# Patient Record
Sex: MALE | Race: WHITE | NOT HISPANIC OR LATINO | Employment: OTHER | URBAN - METROPOLITAN AREA
[De-identification: names, ages, dates, MRNs, and addresses within clinical notes are randomized per-mention and may not be internally consistent; named-entity substitution may affect disease eponyms.]

---

## 2019-10-04 ENCOUNTER — TELEPHONE (OUTPATIENT)
Dept: OTOLARYNGOLOGY | Facility: CLINIC | Age: 69
End: 2019-10-04

## 2019-11-15 ENCOUNTER — CLINICAL SUPPORT (OUTPATIENT)
Dept: AUDIOLOGY | Facility: CLINIC | Age: 69
End: 2019-11-15
Payer: MEDICARE

## 2019-11-15 ENCOUNTER — OFFICE VISIT (OUTPATIENT)
Dept: OTOLARYNGOLOGY | Facility: CLINIC | Age: 69
End: 2019-11-15
Payer: MEDICARE

## 2019-11-15 VITALS
SYSTOLIC BLOOD PRESSURE: 133 MMHG | DIASTOLIC BLOOD PRESSURE: 77 MMHG | HEART RATE: 58 BPM | BODY MASS INDEX: 30.39 KG/M2 | WEIGHT: 194 LBS

## 2019-11-15 DIAGNOSIS — H83.3X3 NOISE-INDUCED HEARING LOSS OF BOTH EARS: ICD-10-CM

## 2019-11-15 DIAGNOSIS — H91.13 PRESBYCUSIS OF BOTH EARS: ICD-10-CM

## 2019-11-15 DIAGNOSIS — H93.13 TINNITUS OF BOTH EARS: ICD-10-CM

## 2019-11-15 DIAGNOSIS — H93.13 TINNITUS AURIUM, BILATERAL: ICD-10-CM

## 2019-11-15 DIAGNOSIS — H90.3 SENSORINEURAL HEARING LOSS (SNHL) OF BOTH EARS: Primary | ICD-10-CM

## 2019-11-15 DIAGNOSIS — H90.3 SENSORINEURAL HEARING LOSS OF BOTH EARS: Primary | ICD-10-CM

## 2019-11-15 PROCEDURE — 92557 PR COMPREHENSIVE HEARING TEST: ICD-10-PCS | Mod: S$GLB,,, | Performed by: AUDIOLOGIST

## 2019-11-15 PROCEDURE — 99203 OFFICE O/P NEW LOW 30 MIN: CPT | Mod: S$GLB,,, | Performed by: OTOLARYNGOLOGY

## 2019-11-15 PROCEDURE — 99999 PR PBB SHADOW E&M-EST. PATIENT-LVL III: ICD-10-PCS | Mod: PBBFAC,,, | Performed by: OTOLARYNGOLOGY

## 2019-11-15 PROCEDURE — 99203 PR OFFICE/OUTPT VISIT, NEW, LEVL III, 30-44 MIN: ICD-10-PCS | Mod: S$GLB,,, | Performed by: OTOLARYNGOLOGY

## 2019-11-15 PROCEDURE — 92567 TYMPANOMETRY: CPT | Mod: S$GLB,,, | Performed by: AUDIOLOGIST

## 2019-11-15 PROCEDURE — 99999 PR PBB SHADOW E&M-EST. PATIENT-LVL III: CPT | Mod: PBBFAC,,, | Performed by: OTOLARYNGOLOGY

## 2019-11-15 PROCEDURE — 92557 COMPREHENSIVE HEARING TEST: CPT | Mod: S$GLB,,, | Performed by: AUDIOLOGIST

## 2019-11-15 PROCEDURE — 92567 PR TYMPA2METRY: ICD-10-PCS | Mod: S$GLB,,, | Performed by: AUDIOLOGIST

## 2019-11-15 NOTE — PROGRESS NOTES
Tanner Ulloa was seen in the clinic today for an audiological evaluation.  Mr. Ulloa reported bilateral hearing loss and tinnitus.  Mr. Ulloa currently utilizes binaural [employer] hearing aids.    Audiological testing revealed a moderate to severe sensorineural hearing loss for the right ear and a mild to severe sensorineural hearing loss for the left ear.  A speech reception threshold was obtained at 50 dBHL for the right ear and at 60 dBHL for the left ear.  Speech discrimination was 56% for the right ear and 60% for the left ear.      Tympanometry testing revealed a Type A tympanogram for the right ear and a Type A tympanogram for the left ear.      Recommendations:  1. Otologic evaluation  2. Annual audiological evaluation  3. Hearing protection when in noise   4. Continue use of binaural amplification  5. PRN follow-up with dispensing audiologist for hearing aid service

## 2019-11-18 NOTE — PROGRESS NOTES
Subjective:       Patient ID: Tanner Ulloa is a 69 y.o. male.    Chief Complaint: Hearing Loss    HPI     Tanner Ulloa is a 69 y.o. male  Presents for evaluation of severe bilateral hearing loss.  Hearing loss has been present for decades.  He has a history of noise exposure due to his work and he is also a frequent [place].  He has expensive hearing aids, but he says they do not give him any benefit so he rather not wear them.  He has associated tinnitus that is constant.  He denies any history of ear infections.  He wants to know if there is any surgery that could restore his hearing.  He says he met someone who had a laser ear surgery that resolved her hearing loss.    Review of Systems   Constitutional: Negative for chills, fever and unexpected weight change.   HENT: Negative for sore throat and trouble swallowing.    Eyes: Negative for pain and visual disturbance.   Respiratory: Negative for apnea and shortness of breath.    Cardiovascular: Negative for chest pain and palpitations.   Gastrointestinal: Negative for abdominal pain and nausea.   Endocrine: Negative for cold intolerance and heat intolerance.   Musculoskeletal: Negative for joint swelling and neck stiffness.   Skin: Negative for color change and rash.   Neurological: Negative for facial asymmetry and headaches.   Hematological: Negative for adenopathy. Does not bruise/bleed easily.   Psychiatric/Behavioral: Negative for agitation. The patient is not nervous/anxious.        Objective:      Physical Exam   Constitutional: He is oriented to person, place, and time. He appears well-developed and well-nourished. No distress.   HENT:   Head: Normocephalic and atraumatic.   Right Ear: Tympanic membrane, external ear and ear canal normal.   Left Ear: Tympanic membrane, external ear and ear canal normal.   Nose: Nose normal.   Mouth/Throat: Uvula is midline, oropharynx is clear and moist and mucous membranes are normal.   Cadet: Midline  Rinne:  AC > BC @ 512Hz   Eyes: Pupils are equal, round, and reactive to light. Conjunctivae and EOM are normal.   Neck: Normal range of motion. No tracheal deviation present. No thyromegaly present.   Cardiovascular: Normal rate and regular rhythm.   Pulmonary/Chest: Effort normal. No respiratory distress.   Musculoskeletal: Normal range of motion.   Lymphadenopathy:        Head (right side): No submental, no submandibular and no tonsillar adenopathy present.        Head (left side): No submental, no submandibular and no tonsillar adenopathy present.     He has no cervical adenopathy.   Neurological: He is alert and oriented to person, place, and time.   Psychiatric: He has a normal mood and affect. His behavior is normal.   Nursing note and vitals reviewed.      Data:      Audiogram tracings independently reviewed and discussed with patient shows moderate-severe SNHL with poor WRS    Assessment:       1. Sensorineural hearing loss (SNHL) of both ears    2. Tinnitus aurium, bilateral    3. Noise-induced hearing loss of both ears    4. Presbycusis of both ears        Plan:     In summary this is a pleasant 69 y.o. with severe hearing impairment, who has difficulty with conversation and does not benefit from amplification     patient is a borderline CI candidate at this time.   Encourage hearing aid use   may consider CI screening evaluation if interested   patient and wife to call if they decide they want to pursue candidate evaluation further

## 2019-12-09 ENCOUNTER — PATIENT MESSAGE (OUTPATIENT)
Dept: GASTROENTEROLOGY | Facility: CLINIC | Age: 69
End: 2019-12-09

## 2019-12-09 ENCOUNTER — OFFICE VISIT (OUTPATIENT)
Dept: GASTROENTEROLOGY | Facility: CLINIC | Age: 69
End: 2019-12-09
Payer: MEDICARE

## 2019-12-09 ENCOUNTER — TELEPHONE (OUTPATIENT)
Dept: GASTROENTEROLOGY | Facility: CLINIC | Age: 69
End: 2019-12-09

## 2019-12-09 ENCOUNTER — LAB VISIT (OUTPATIENT)
Dept: LAB | Facility: HOSPITAL | Age: 69
End: 2019-12-09
Attending: INTERNAL MEDICINE
Payer: MEDICARE

## 2019-12-09 VITALS
BODY MASS INDEX: 26.82 KG/M2 | HEIGHT: 67 IN | HEART RATE: 84 BPM | SYSTOLIC BLOOD PRESSURE: 122 MMHG | WEIGHT: 170.88 LBS | DIASTOLIC BLOOD PRESSURE: 80 MMHG

## 2019-12-09 DIAGNOSIS — K58.0 IRRITABLE BOWEL SYNDROME WITH DIARRHEA: ICD-10-CM

## 2019-12-09 DIAGNOSIS — R19.8 IRREGULAR BOWEL HABITS: ICD-10-CM

## 2019-12-09 DIAGNOSIS — B18.2 CHRONIC HEPATITIS C WITHOUT HEPATIC COMA: Chronic | ICD-10-CM

## 2019-12-09 DIAGNOSIS — B18.2 CHRONIC HEPATITIS C WITHOUT HEPATIC COMA: Primary | Chronic | ICD-10-CM

## 2019-12-09 PROBLEM — B19.20 HEPATITIS C: Chronic | Status: ACTIVE | Noted: 2019-12-09

## 2019-12-09 LAB
BASOPHILS # BLD AUTO: 0.07 K/UL (ref 0–0.2)
BASOPHILS NFR BLD: 0.7 % (ref 0–1.9)
DIFFERENTIAL METHOD: ABNORMAL
EOSINOPHIL # BLD AUTO: 0.3 K/UL (ref 0–0.5)
EOSINOPHIL NFR BLD: 3.5 % (ref 0–8)
ERYTHROCYTE [DISTWIDTH] IN BLOOD BY AUTOMATED COUNT: 14.7 % (ref 11.5–14.5)
HCT VFR BLD AUTO: 55.2 % (ref 40–54)
HGB BLD-MCNC: 16.8 G/DL (ref 14–18)
IMM GRANULOCYTES # BLD AUTO: 0.04 K/UL (ref 0–0.04)
IMM GRANULOCYTES NFR BLD AUTO: 0.4 % (ref 0–0.5)
INR PPP: 1 (ref 0.8–1.2)
LYMPHOCYTES # BLD AUTO: 1.4 K/UL (ref 1–4.8)
LYMPHOCYTES NFR BLD: 14.2 % (ref 18–48)
MCH RBC QN AUTO: 28.7 PG (ref 27–31)
MCHC RBC AUTO-ENTMCNC: 30.4 G/DL (ref 32–36)
MCV RBC AUTO: 94 FL (ref 82–98)
MONOCYTES # BLD AUTO: 0.9 K/UL (ref 0.3–1)
MONOCYTES NFR BLD: 9.5 % (ref 4–15)
NEUTROPHILS # BLD AUTO: 7.1 K/UL (ref 1.8–7.7)
NEUTROPHILS NFR BLD: 71.7 % (ref 38–73)
NRBC BLD-RTO: 0 /100 WBC
PLATELET # BLD AUTO: 249 K/UL (ref 150–350)
PMV BLD AUTO: 9.6 FL (ref 9.2–12.9)
PROTHROMBIN TIME: 10.4 SEC (ref 9–12.5)
RBC # BLD AUTO: 5.86 M/UL (ref 4.6–6.2)
WBC # BLD AUTO: 9.83 K/UL (ref 3.9–12.7)

## 2019-12-09 PROCEDURE — 36415 COLL VENOUS BLD VENIPUNCTURE: CPT

## 2019-12-09 PROCEDURE — 86706 HEP B SURFACE ANTIBODY: CPT

## 2019-12-09 PROCEDURE — 99204 PR OFFICE/OUTPT VISIT, NEW, LEVL IV, 45-59 MIN: ICD-10-PCS | Mod: S$GLB,,, | Performed by: INTERNAL MEDICINE

## 2019-12-09 PROCEDURE — 83540 ASSAY OF IRON: CPT

## 2019-12-09 PROCEDURE — 1159F MED LIST DOCD IN RCRD: CPT | Mod: S$GLB,,, | Performed by: INTERNAL MEDICINE

## 2019-12-09 PROCEDURE — 85025 COMPLETE CBC W/AUTO DIFF WBC: CPT

## 2019-12-09 PROCEDURE — 1126F AMNT PAIN NOTED NONE PRSNT: CPT | Mod: S$GLB,,, | Performed by: INTERNAL MEDICINE

## 2019-12-09 PROCEDURE — 85610 PROTHROMBIN TIME: CPT

## 2019-12-09 PROCEDURE — 82105 ALPHA-FETOPROTEIN SERUM: CPT

## 2019-12-09 PROCEDURE — 86704 HEP B CORE ANTIBODY TOTAL: CPT

## 2019-12-09 PROCEDURE — 87522 HEPATITIS C REVRS TRNSCRPJ: CPT

## 2019-12-09 PROCEDURE — 80053 COMPREHEN METABOLIC PANEL: CPT

## 2019-12-09 PROCEDURE — 1126F PR PAIN SEVERITY QUANTIFIED, NO PAIN PRESENT: ICD-10-PCS | Mod: S$GLB,,, | Performed by: INTERNAL MEDICINE

## 2019-12-09 PROCEDURE — 1101F PT FALLS ASSESS-DOCD LE1/YR: CPT | Mod: CPTII,S$GLB,, | Performed by: INTERNAL MEDICINE

## 2019-12-09 PROCEDURE — 82728 ASSAY OF FERRITIN: CPT

## 2019-12-09 PROCEDURE — 1159F PR MEDICATION LIST DOCUMENTED IN MEDICAL RECORD: ICD-10-PCS | Mod: S$GLB,,, | Performed by: INTERNAL MEDICINE

## 2019-12-09 PROCEDURE — 1101F PR PT FALLS ASSESS DOC 0-1 FALLS W/OUT INJ PAST YR: ICD-10-PCS | Mod: CPTII,S$GLB,, | Performed by: INTERNAL MEDICINE

## 2019-12-09 PROCEDURE — 82977 ASSAY OF GGT: CPT

## 2019-12-09 PROCEDURE — 99204 OFFICE O/P NEW MOD 45 MIN: CPT | Mod: S$GLB,,, | Performed by: INTERNAL MEDICINE

## 2019-12-09 PROCEDURE — 99999 PR PBB SHADOW E&M-EST. PATIENT-LVL III: ICD-10-PCS | Mod: PBBFAC,,, | Performed by: INTERNAL MEDICINE

## 2019-12-09 PROCEDURE — 99999 PR PBB SHADOW E&M-EST. PATIENT-LVL III: CPT | Mod: PBBFAC,,, | Performed by: INTERNAL MEDICINE

## 2019-12-09 PROCEDURE — 87340 HEPATITIS B SURFACE AG IA: CPT

## 2019-12-09 RX ORDER — FLUOXETINE HYDROCHLORIDE 40 MG/1
40 CAPSULE ORAL DAILY
COMMUNITY

## 2019-12-09 RX ORDER — DOXYCYCLINE 100 MG/1
100 CAPSULE ORAL EVERY 12 HOURS
COMMUNITY
End: 2020-03-30

## 2019-12-09 NOTE — TELEPHONE ENCOUNTER
Offered patient wife sooner appointment but due to wife schedule patient has an appointment for 01/08/2020 with JONAS Quiles

## 2019-12-09 NOTE — PROGRESS NOTES
[facility]  Gastroenterology Note    Patient referred at the request of:  Freddie Mondragon MD  [address] [facility]   [address]  [place] [address]    Subjective:       Patient ID: Tanner Ulloa is a 69 y.o. male.    Chief Complaint: Diarrhea and Hepatitis C    Mr. Ulloa had a positive C. Diff antigen and a negative toxin in 2015. He also had a negative colonoscopy in 2015 and normal EGD as well. Hepatitis C: Patient complains has a positive Hepatitis C antibody test and PCR with genotype 1b. Patient tested positive several years ago. Test was performed as part of an evaluation of abnormal liver function test:(elevated ALT, elevated AST). Hepatitis C risk factors present are tattoos. Patient denies accidental needle stick, history of blood transfusion, history of clotting factor transfusion, intranasal drug use, multiple sexual partners, renal dialysis, sexual contact with person with liver disease. Patient has had other studies performed. Results: ultrasound of abdomen, result: normal, hepatitis C RNA by PCR, result: positive. Patient has not had prior treatment for Hepatitis C. Patient does not have a past history of liver disease. Patient does not have a family history of liver disease.  Patient's current symptoms include diarrhea, nausea and weight loss. Patient denies anorexia, dark urine, headache and jaundice. Symptoms have been present for approximately several years. The symptoms are stable.        Diarrhea    This is a chronic problem. The current episode started more than 1 year ago. The problem occurs 5 to 10 times per day. The problem has been waxing and waning. The stool consistency is described as mucous and watery. The patient states that diarrhea does not awaken him from sleep. Associated symptoms include bloating, increased flatus and weight loss. Pertinent negatives include no abdominal pain, arthralgias, chills, coughing, fever, headaches, myalgias, sweats, URI or vomiting. The  symptoms are aggravated by dairy products. There are no known risk factors. He has tried nothing for the symptoms. His past medical history is significant for irritable bowel syndrome. There is no history of bowel resection, inflammatory bowel disease, malabsorption, a recent abdominal surgery or short gut syndrome.     Past Medical History:   Diagnosis Date    Anticoagulant long-term use     Cholelithiasis with choledocholithiasis 2013    tx's with cholecystectomy and ERCP    Chronic back pain     GERD (gastroesophageal reflux disease)     Heart attack     Hepatitis C     History of Clostridium difficile infection     Hypertension     Irritable bowel syndrome     Testosterone deficiency      Past Surgical History:   Procedure Laterality Date    BACK SURGERY  04/2004    CHOLECYSTECTOMY  04/2013    COLONOSCOPY  2015    CORONARY STENT PLACEMENT      ERCP  2013    stent plaecement; then stone removal and stent replacement; then stent removal    KNEE ARTHRODESIS Right     UPPER GASTROINTESTINAL ENDOSCOPY       Current Outpatient Medications on File Prior to Visit   Medication Sig Dispense Refill    amlodipine (NORVASC) 10 MG tablet Take 10 mg by mouth every evening. 1/2 tab hs      aspirin 81 MG Chew Take 81 mg by mouth once daily.      clopidogrel (PLAVIX) 75 mg tablet Take 75 mg by mouth once daily.      doxycycline (VIBRAMYCIN) 100 MG Cap Take 100 mg by mouth every 12 (twelve) hours.      FLUoxetine 40 MG capsule Take 40 mg by mouth once daily.      metoprolol tartrate (LOPRESSOR) 50 MG tablet Take 50 mg by mouth 2 (two) times daily. 1/2 tab      omeprazole-sodium bicarbonate (ZEGERID) 40-1.1 mg-gram per capsule Take 1 capsule by mouth before breakfast.      oxycodone (ROXICODONE) 30 MG Tab Take 5 mg by mouth every 6 (six) hours as needed.      citalopram (CELEXA) 20 MG tablet Take 20 mg by mouth once daily.      clonazepam (KLONOPIN) 2 MG Tab Take 2 mg by mouth every evening. Leg cramping       colestipol (COLESTID) 1 gram Tab Take 1 tablet (1 g total) by mouth 2 (two) times daily. 60 tablet 6    cyanocobalamin (VITAMIN B-12) 100 MCG tablet Take 100 mcg by mouth once daily.      furosemide (LASIX) 20 MG tablet Take 1 tablet (20 mg total) by mouth 2 (two) times daily. 30 tablet 0    isosorbide mononitrate (IMDUR) 60 MG 24 hr tablet Take 60 mg by mouth once daily.      losartan (COZAAR) 50 MG tablet       oxybutynin (DITROPAN-XL) 5 MG TR24 Take 5 mg by mouth once daily.      peg-electrolyte soln (PEG-3350 WITH FLAVOR PACKS) 420 gram SolR Use as directed 4000 mL 0     No current facility-administered medications on file prior to visit.      Review of patient's allergies indicates:   Allergen Reactions    Dilaudid [hydromorphone (bulk)] Other (See Comments)     Severe headache    Statins-hmg-coa reductase inhibitors      Cramps is the reaction    Stadol [butorphanol tartrate] Palpitations    Sulfa (sulfonamide antibiotics) Rash    Talwin [pentazocine lactate] Palpitations     Social History     Socioeconomic History    Marital status:      Spouse name: Not on file    Number of children: Not on file    Years of education: Not on file    Highest education level: Not on file   Occupational History    Not on file   Social Needs    Financial resource strain: Not on file    Food insecurity:     Worry: Not on file     Inability: Not on file    Transportation needs:     Medical: Not on file     Non-medical: Not on file   Tobacco Use    Smoking status: Former Smoker    Smokeless tobacco: Never Used   Substance and Sexual Activity    Alcohol use: No    Drug use: No    Sexual activity: Yes     Partners: Female   Lifestyle    Physical activity:     Days per week: Not on file     Minutes per session: Not on file    Stress: Not on file   Relationships    Social connections:     Talks on phone: Not on file     Gets together: Not on file     Attends Jainism service: Not on file      "Active member of club or organization: Not on file     Attends meetings of clubs or organizations: Not on file     Relationship status: Not on file   Other Topics Concern    Not on file   Social History Narrative         Family History   Problem Relation Age of Onset    Diverticulitis Father         required bowel resection    Colon cancer Neg Hx          Review of Systems   Constitutional: Positive for weight loss. Negative for activity change, appetite change, chills, fatigue, fever and unexpected weight change.   HENT: Negative for congestion, ear pain, hearing loss, mouth sores, trouble swallowing and voice change.    Eyes: Negative for pain, redness and itching.   Respiratory: Negative for apnea, cough, choking, chest tightness, shortness of breath and wheezing.    Cardiovascular: Negative for chest pain, palpitations and leg swelling.   Gastrointestinal: Positive for bloating, diarrhea, flatus and nausea. Negative for abdominal distention, abdominal pain, anal bleeding, blood in stool, constipation and vomiting.   Endocrine: Negative for cold intolerance, heat intolerance and polyphagia.   Genitourinary: Negative for dysuria, hematuria and urgency.   Musculoskeletal: Negative for arthralgias, joint swelling, myalgias and neck pain.   Skin: Negative for pallor and rash.   Allergic/Immunologic: Negative for environmental allergies and food allergies.   Neurological: Positive for weakness. Negative for dizziness, facial asymmetry, light-headedness and headaches.   Hematological: Negative for adenopathy. Does not bruise/bleed easily.   Psychiatric/Behavioral: Negative for agitation, behavioral problems, confusion and decreased concentration. The patient is nervous/anxious.        Objective:       Vitals:    12/09/19 1031   BP: 122/80   Pulse: 84   Weight: 77.5 kg (170 lb 13.7 oz)   Height: 5' 7" (1.702 m)       Physical Exam   Constitutional: He is oriented to person, place, and time. He appears " well-developed and well-nourished.   HENT:   Head: Normocephalic and atraumatic.   Eyes: Pupils are equal, round, and reactive to light. Conjunctivae are normal.   Neck: Normal range of motion. Neck supple. No tracheal deviation present. No thyromegaly present.   Cardiovascular: Normal rate and regular rhythm.   Pulmonary/Chest: Effort normal and breath sounds normal. He has no wheezes. He has no rales. He exhibits no tenderness.   Abdominal: Soft. Bowel sounds are normal. He exhibits no distension and no mass. There is no tenderness. There is no guarding.   Musculoskeletal: Normal range of motion.   Lymphadenopathy:     He has no cervical adenopathy.   Neurological: He is alert and oriented to person, place, and time. No cranial nerve deficit.   Skin: Skin is warm and dry.   Psychiatric: He has a normal mood and affect. His behavior is normal.   Nursing note and vitals reviewed.      Assessment:       1. Chronic hepatitis C without hepatic coma    2. Irregular bowel habits    3. Irritable bowel syndrome with diarrhea        Plan:       Chronic hepatitis C without hepatic coma  Comments:  genotype 1b  Orders:  -     US Abdomen Limited (LIVER); Future; Expected date: 12/09/2019  -     AFP tumor marker; Future; Expected date: 12/09/2019  -     CBC W/ AUTO DIFFERENTIAL; Future; Expected date: 12/09/2019  -     Ferritin; Future; Expected date: 12/09/2019  -     Gamma GT; Future; Expected date: 12/09/2019  -     Hepatitis B core antibody, total; Future; Expected date: 12/09/2019  -     Hepatitis B surface antibody; Future; Expected date: 12/09/2019  -     Hepatitis B surface antigen; Future; Expected date: 12/09/2019  -     Hepatitis C RNA, quantitative, PCR; Future; Expected date: 12/09/2019  -     Iron and TIBC; Future; Expected date: 12/09/2019  -     Protime-INR; Future; Expected date: 12/09/2019  -     Hepatic function panel; Future; Expected date: 12/09/2019  -     Comprehensive metabolic panel; Future; Expected  date: 12/09/2019    Irregular bowel habits  -     Stool Exam-Ova,Cysts,Parasites; Future; Expected date: 12/09/2019  -     CULTURE, STOOL; Future; Expected date: 12/09/2019  -     WBC, Stool; Future; Expected date: 12/09/2019  -     H. pylori antigen, stool; Future; Expected date: 12/09/2019  -     Clostridium difficile EIA; Future; Expected date: 12/09/2019  -     Lactoferrin, fecal, quantitative; Future; Expected date: 12/09/2019  -     Calprotectin; Future; Expected date: 12/09/2019    Irritable bowel syndrome with diarrhea      Thanks for letting us participate in the care of your patient.    Ricco Davis M.D.

## 2019-12-09 NOTE — LETTER
December 9, 2019      Freddie Mondragon MD  [address] [address]           [facility]  [address] [place] [address]  Phone: [phone]  Fax: [phone]          Patient: Tanner Ulloa   MR Number: [id number]   YOB: 1950   Date of Visit: 12/9/2019       Dear Dr. Freddie Mondragon:    Thank you for referring Tanner Ulloa to me for evaluation. Attached you will find relevant portions of my assessment and plan of care.    If you have questions, please do not hesitate to call me. I look forward to following Tanner Ulloa along with you.    Sincerely,    Ricco Davis MD    Enclosure  CC:  No Recipients    If you would like to receive this communication electronically, please contact [email or web address] or [phone] to request more information on [employer] access.    For providers and/or their staff who would like to refer a patient to [facility], please contact us through our one-stop-shop provider referral line, [facility], at [phone].    If you feel you have received this communication in error or would no longer like to receive these types of communications, please e-mail [email or web address]

## 2019-12-09 NOTE — PATIENT INSTRUCTIONS
Chronic hepatitis C without hepatic coma  Comments:  genotype 1b  Orders:  -     US Abdomen Limited (LIVER); Future; Expected date: 12/09/2019  -     AFP tumor marker; Future; Expected date: 12/09/2019  -     CBC W/ AUTO DIFFERENTIAL; Future; Expected date: 12/09/2019  -     Ferritin; Future; Expected date: 12/09/2019  -     Gamma GT; Future; Expected date: 12/09/2019  -     Hepatitis B core antibody, total; Future; Expected date: 12/09/2019  -     Hepatitis B surface antibody; Future; Expected date: 12/09/2019  -     Hepatitis B surface antigen; Future; Expected date: 12/09/2019  -     Hepatitis C RNA, quantitative, PCR; Future; Expected date: 12/09/2019  -     Iron and TIBC; Future; Expected date: 12/09/2019  -     Protime-INR; Future; Expected date: 12/09/2019  -     Hepatic function panel; Future; Expected date: 12/09/2019  -     Comprehensive metabolic panel; Future; Expected date: 12/09/2019    Irregular bowel habits  -     Stool Exam-Ova,Cysts,Parasites; Future; Expected date: 12/09/2019  -     CULTURE, STOOL; Future; Expected date: 12/09/2019  -     WBC, Stool; Future; Expected date: 12/09/2019  -     H. pylori antigen, stool; Future; Expected date: 12/09/2019  -     Clostridium difficile EIA; Future; Expected date: 12/09/2019  -     Lactoferrin, fecal, quantitative; Future; Expected date: 12/09/2019  -     Calprotectin; Future; Expected date: 12/09/2019    Irritable bowel syndrome with diarrhea      Thanks for trusting us with your healthcare needs and using [facility]. If you want to ask us a question, you can do so by replying to this message or by calling [phone].    Sincerely,    Ricco Davis M.D.      To rate your experience with Dr. Davis please click on the link below:    [email or web address]          Uncertain Causes of Diarrhea (Adult)    Diarrhea is when stools are loose and watery. This can be caused by:  · Viral infections  · Bacterial  infections  · Food poisoning  · Parasites  · Irritable bowel syndrome (IBS)  · Inflammatory bowel diseases such as ulcerative colitis, Crohn's disease, and celiac disease  · Food intolerance, such as to lactose, the sugar found in milk and milk products  · Reaction to medicines like antibiotics, laxatives, cancer drugs, and antacids  Along with diarrhea, you may also have:  · Abdominal pain and cramping  · Nausea and vomiting  · Loss of bowel control  · Fever and chills  · Bloody stools  In some cases, antibiotics may help to treat diarrhea. You may have a stool sample test. This is done to see what is causing your diarrhea, and if antibiotics will help treat it. The results of a stool sample test may take up to 2 days. The healthcare provider may not give you antibiotics until he or she has the stool test results.  Diarrhea can cause dehydration. This is the loss of too much water and other fluids from the body. When this occurs, body fluid must be replaced. This can be done with oral rehydration solutions. Oral rehydration solutions are available at drugstores and grocery stores without a prescription.  Home care  Follow all instructions given by your healthcare provider. Rest at home for the next 24 hours, or until you feel better. Avoid caffeine, tobacco, and alcohol. These can make diarrhea, cramping, and pain worse.  If taking medicines:  · Dont take over-the-counter diarrhea or nausea medicines unless your healthcare provider tells you to.  · You may use acetaminophen or NSAID medicines like ibuprofen or naproxen to reduce pain and fever. Dont use these if you have chronic liver or kidney disease, or ever had a stomach ulcer or gastrointestinal bleeding. Don't use NSAID medicines if you are already taking one for another condition (like arthritis) or are on daily aspirin therapy (such as for heart disease or after a stroke). Talk with your healthcare provider first.  · If antibiotics were prescribed, be  sure you take them until they are finished. Dont stop taking them even when you feel better. Antibiotics must be taken as a full course.  To prevent the spread of illness:  · Remember that washing with soap and water and using alcohol-based  is the best way to prevent the spread of infection.  · Clean the toilet after each use.  · Wash your hands before eating.  · Wash your hands before and after preparing food. Keep in mind that people with diarrhea or vomiting should not prepare food for others.  · Wash your hands after using cutting boards, countertops, and knives that have been in contact with raw foods.  · Wash and then peel fruits and vegetables.  · Keep uncooked meats away from cooked and ready-to-eat foods.  · Use a food thermometer when cooking. Cook poultry to at least 165°F (74°C). Cook ground meat (beef, veal, pork, lamb) to at least 160°F (71°C). Cook fresh beef, veal, lamb, and pork to at least 145°F (63°C).  · Dont eat raw or undercooked eggs (poached or alana side up), poultry, meat, or unpasteurized milk and juices.  Food and drinks  The main goal while treating vomiting or diarrhea is to prevent dehydration. This is done by taking small amounts of liquids often.  · Keep in mind that liquids are more important than food right now.  · Drink only small amounts of liquids at a time.  · Dont force yourself to eat, especially if you are having cramping, vomiting, or diarrhea. Dont eat large amounts at a time, even if you are hungry.  · If you eat, avoid fatty, greasy, spicy, or fried foods.  · Dont eat dairy foods or drink milk if you have diarrhea. These can make diarrhea worse.  During the first 24 hours you can try:  · Oral rehydration solutions. Do not use sports drinks. They have too much sugar and not enough electrolytes.  · Soft drinks without caffeine  · Ginger ale  · Water (plain or flavored)  · Decaf tea or coffee  · Clear broth, consommé, or bouillon  · Gelatin, popsicles, or  frozen fruit juice bars  The second 24 hours, if you are feeling better, you can add:  · Hot cereal, plain toast, bread, rolls, or crackers  · Plain noodles, rice, mashed potatoes, chicken noodle soup, or rice soup  · Unsweetened canned fruit (no pineapple)  · Bananas  As you recover:  · Limit fat intake to less than 15 grams per day. Dont eat margarine, butter, oils, mayonnaise, sauces, gravies, fried foods, peanut butter, meat, poultry, or fish.  · Limit fiber. Dont eat raw or cooked vegetables, fresh fruits except bananas, or bran cereals.  · Limit caffeine and chocolate.  · Limit dairy.  · Dont use spices or seasonings except salt.  · Go back to your normal diet over time, as you feel better and your symptoms improve.  · If the symptoms come back, go back to a simple diet or clear liquids.  Follow-up care  Follow up with your healthcare provider, or as advised. If a stool sample was taken or cultures were done, call the healthcare provider for the results as instructed.  Call 911  Call 911 if you have any of these symptoms:  · Trouble breathing  · Confusion  · Extreme drowsiness or trouble walking  · Loss of consciousness  · Rapid heart rate  · Chest pain  · Stiff neck  · Seizure  When to seek medical advice  Call your healthcare provider right away if any of these occur:  · Abdominal pain that gets worse  · Constant lower right abdominal pain  · Continued vomiting and inability to keep liquids down  · Diarrhea more than 5 times a day  · Blood in vomit or stool  · Dark urine or no urine for 8 hours, dry mouth and tongue, tiredness, weakness, or dizziness  · Drowsiness  · New rash  · You dont get better in 2 to 3 days  · Fever of 100.4°F (38°C) or higher that doesnt get lower with medicine  Date Last Reviewed: 1/3/2016  © 4061-2720 [employer]. [address] [place] [address], [place] [address]. All rights reserved. This information is not intended as a substitute for professional medical care. Always  follow your healthcare professional's instructions.        Treating Hepatitis C (HCV)    Its likely that hepatitis C virus (HCV) was found when routine liver tests were done on your blood, or after you donated blood. Once hepatitis C is found, a medical evaluation helps assess if you have liver disease. You may also have a small liver sample (biopsy) taken to see if medicines may help. Acute Hepatitis C often resolves without treatment. With new treatments, chronic (long-term) hepatitis C can be cured in most people.   Take these steps  To help keep your body strong and possibly ease symptoms:  · Avoid stressing your liver. Dont use alcohol or any unneeded medicines. This includes over-the-counter medicines such as acetaminophen. These can stress the liver. Always check with your healthcare provider first before taking any over-the-counter medicines or supplements.  · Eat a balanced diet. A diet low in fat, high in fiber, and full of fresh fruits and vegetables helps you stay healthy.  · Take prescribed medicines. Your provider will talk with you about the types of medicines that will work best for you. You may need to take medicines to treat hepatitis C for several months. Compared with past treatments, new medicines are very effective at curing the disease. They also have fewer side effects, and are taken for a shorter period of time.  Follow up regularly  Hepatitis C can get worse and hurt your liver without your knowing it. Stay in regular contact with your provider and healthcare team. They can watch your condition. They can tell you about any new research and types of treatment for hepatitis C.     Remember: No vaccine or medicine can prevent the spread of HCV and hepatitis C. Its up to you to keep others safe.  · Cover all skin breaks and sores yourself. If you need help, the person treating you should wear latex gloves.  · Use condoms during sex.  · Dont donate blood, plasma, other body tissue, or  sperm.  · Dont share needles, razors, toothbrushes, manicure tools, or other personal items.  · Hepatitis C can live on surfaces outside the body at room temperature for up to 4 days. Clean any blood spills using 1 part household bleach with 10 parts water. Wear gloves when cleaning.  · Talk with your healthcare provider about joining a support group.   Date Last Reviewed: 7/1/2016  © 0032-6664 [employer]. [address] [place] [address], [place] [address]. All rights reserved. This information is not intended as a substitute for professional medical care. Always follow your healthcare professional's instructions.        Hepatitis C: Protecting Your Liver    Taking good care of yourself is the best way to prevent health problems related to the hepatitis C virus (HCV). Give your liver a fighting chance. This means avoiding things that can make liver damage worse. And help your body defend against HCV infection by staying healthy.  Watch medicines and supplements  Some medicines and herbal supplements can harm your liver. To protect yourself:  · Check with your healthcare provider or pharmacist before taking anything that you buy over-the-counter.  · Learn the generic and brand names for over-the-counter products that may affect your liver. Be especially aware of the many combination medicines that contain acetaminophen.  · Make sure you tell any healthcare provider who prescribes medicine for you that you have hepatitis C.  · Because herbal medicines are not FDA regulated, you may not know which could damage your liver. So avoid them unless you speak with your healthcare provider.  · If you have cirrhosis, get specific instructions from your healthcare provider.  · If you are overweight, lose weight. Fatty liver can cause the same damage as alcohol.  Do not drink alcohol  Your liver works hard to process alcohol. If you have HCV infection, drinking alcohol may make you more likely to develop cirrhosis. You may  also develop it faster. Your best defense against hepatitis C is to not drink any alcohol.  Get tested  It is important to get tested for hepatitis A and hepatitis B. If you are not immune to these, ask your healthcare provider about getting vaccinated against them.  Stay healthy  Your bodys immune system fights against infections. Its more able to do this when your body is healthy. Eating healthy foods and getting plenty of sleep will help keep your body strong. And staying upbeat can help you keep hepatitis C in perspective.  Date Last Reviewed: 7/1/2016  © 0105-7690 [employer]. [address] [place] [address]. All rights reserved. This information is not intended as a substitute for professional medical care. Always follow your healthcare professional's instructions.        Diet and Lifestyle Tips for Irritable Bowel Syndrome (IBS)    Your healthcare professional may suggest some lifestyle changes to help control your IBS. Changing your diet and managing stress are two of the most important. Follow your healthcare providers instructions and try some of the suggestions below.  Change your diet  Your diet may be an important cause of IBS symptoms. You may want to try the following:  · Pay attention to what foods bother you, and avoid them. For example, dairy products are hard for some people to digest.  · Drink 6 to 8 glasses of water a day.  · Avoid caffeine and tobacco. These are muscle stimulants and can affect the working of your digestive tract.  · Avoid alcohol, which can irritate your digestive tract and make your symptoms worse.  · Eat more fiber if constipation is a problem. Fiber makes the stool softer and easier to pass through the colon.  Reduce stress  If stress or anxiety makes your IBS symptoms worse, learning how to manage stress may help you feel better. Try these tips:  · Identify the causes of stress in your life.  · Learn new ways to cope with them.  · Regular  exercise is a great way to relieve stress. It can also help ease constipation.  Date Last Reviewed: 7/1/2016  © 0282-2538 [employer]. [address] [place] [address], [place] [address]. All rights reserved. This information is not intended as a substitute for professional medical care. Always follow your healthcare professional's instructions.

## 2019-12-09 NOTE — TELEPHONE ENCOUNTER
Pt needs an appt with a Liver provider per DR Davis in about 2 weeks.   ( this is the only number they have; this is the pts wife, Agustina)    Thank you

## 2019-12-10 DIAGNOSIS — B18.2 CHRONIC HEPATITIS C WITHOUT HEPATIC COMA: ICD-10-CM

## 2019-12-10 DIAGNOSIS — R76.8 HEPATITIS B CORE ANTIBODY POSITIVE: Primary | ICD-10-CM

## 2019-12-10 LAB
AFP SERPL-MCNC: 3.3 NG/ML (ref 0–8.4)
ALBUMIN SERPL BCP-MCNC: 3.7 G/DL (ref 3.5–5.2)
ALBUMIN SERPL BCP-MCNC: 3.7 G/DL (ref 3.5–5.2)
ALP SERPL-CCNC: 131 U/L (ref 55–135)
ALP SERPL-CCNC: 131 U/L (ref 55–135)
ALT SERPL W/O P-5'-P-CCNC: 20 U/L (ref 10–44)
ALT SERPL W/O P-5'-P-CCNC: 20 U/L (ref 10–44)
ANION GAP SERPL CALC-SCNC: 9 MMOL/L (ref 8–16)
AST SERPL-CCNC: 19 U/L (ref 10–40)
AST SERPL-CCNC: 19 U/L (ref 10–40)
BILIRUB DIRECT SERPL-MCNC: 0.3 MG/DL (ref 0.1–0.3)
BILIRUB SERPL-MCNC: 0.6 MG/DL (ref 0.1–1)
BILIRUB SERPL-MCNC: 0.6 MG/DL (ref 0.1–1)
BUN SERPL-MCNC: 19 MG/DL (ref 8–23)
CALCIUM SERPL-MCNC: 9.1 MG/DL (ref 8.7–10.5)
CHLORIDE SERPL-SCNC: 101 MMOL/L (ref 95–110)
CO2 SERPL-SCNC: 25 MMOL/L (ref 23–29)
CREAT SERPL-MCNC: 1.2 MG/DL (ref 0.5–1.4)
EST. GFR  (AFRICAN AMERICAN): >60 ML/MIN/1.73 M^2
EST. GFR  (NON AFRICAN AMERICAN): >60 ML/MIN/1.73 M^2
FERRITIN SERPL-MCNC: 88 NG/ML (ref 20–300)
GGT SERPL-CCNC: 65 U/L (ref 8–55)
GLUCOSE SERPL-MCNC: 83 MG/DL (ref 70–110)
HBV CORE AB SERPL QL IA: POSITIVE
HBV SURFACE AB SER-ACNC: NEGATIVE M[IU]/ML
HBV SURFACE AG SERPL QL IA: NEGATIVE
IRON SERPL-MCNC: 74 UG/DL (ref 45–160)
POTASSIUM SERPL-SCNC: 4.4 MMOL/L (ref 3.5–5.1)
PROT SERPL-MCNC: 7.3 G/DL (ref 6–8.4)
PROT SERPL-MCNC: 7.3 G/DL (ref 6–8.4)
SATURATED IRON: 20 % (ref 20–50)
SODIUM SERPL-SCNC: 135 MMOL/L (ref 136–145)
TOTAL IRON BINDING CAPACITY: 371 UG/DL (ref 250–450)
TRANSFERRIN SERPL-MCNC: 251 MG/DL (ref 200–375)

## 2019-12-10 NOTE — PROGRESS NOTES
Labs have been reviewed. Results are normal/stable. Please let the patient know.    MELD-Na score: 8 at 12/9/2019 11:51 AM  MELD score: 8 at 12/9/2019 11:51 AM  Calculated from:  Serum Creatinine: 1.2 mg/dL at 12/9/2019 11:51 AM  Serum Sodium: 135 mmol/L at 12/9/2019 11:51 AM  Total Bilirubin: 0.6 mg/dL (Rounded to 1 mg/dL) at 12/9/2019 11:51 AM  INR(ratio): 1.0 at 12/9/2019 11:51 AM  Age: 69 years      Ricco Davis M.D.

## 2019-12-10 NOTE — PROGRESS NOTES
Mr. Tanner Ulloa, Your hepatitis B test were negative but it looks like you were exposed to hepatitis B in the past. I recommend that we check one more test to be certain there is no active infection. My office will contact you for an appointment.     Thanks,     Ricco Davis

## 2019-12-10 NOTE — PROGRESS NOTES
Hepatitis B core antibody positive  -     Hepatitis B DNA, Quant; Future; Expected date: 12/10/2019    Chronic hepatitis C without hepatic coma  -     Hepatitis B DNA, Quant; Future; Expected date: 12/10/2019

## 2019-12-11 ENCOUNTER — TELEPHONE (OUTPATIENT)
Dept: GASTROENTEROLOGY | Facility: CLINIC | Age: 69
End: 2019-12-11

## 2019-12-11 NOTE — TELEPHONE ENCOUNTER
Lab scheduled on 12/18/19, same day as US.   Pts wife said if pt is d/c'd from the hosp and is feeling okay to go, otherwise, they may have to reschedule the appts.

## 2019-12-11 NOTE — TELEPHONE ENCOUNTER
----- Message from Loreta Martinez sent at 12/11/2019  9:46 AM CST -----  Contact: wife-sylvia  Requesting call back to inform provider that pt is currently in hospital due to having a heart attack on 12/10. Please call back at [phone]    Thanks,  Loreta Martinez

## 2019-12-11 NOTE — TELEPHONE ENCOUNTER
Spoke to pts wife, Kalie, and she reports pt had a heart attack yesterday. He is at [facility], had 2 blockages and had to get 2 heart stents.   Reports he is doing ok, still in intensive care but may be discharged tomorrow if he is still doing okay.   Pt wanted to let Dr Davis know.

## 2019-12-12 LAB
HCV RNA SERPL NAA+PROBE-LOG IU: 5.64 LOG (10) IU/ML
HCV RNA SERPL QL NAA+PROBE: DETECTED IU/ML
HCV RNA SPEC NAA+PROBE-ACNC: ABNORMAL IU/ML

## 2020-01-23 ENCOUNTER — PATIENT MESSAGE (OUTPATIENT)
Dept: GASTROENTEROLOGY | Facility: CLINIC | Age: 70
End: 2020-01-23

## 2020-02-03 ENCOUNTER — TELEPHONE (OUTPATIENT)
Dept: GASTROENTEROLOGY | Facility: CLINIC | Age: 70
End: 2020-02-03

## 2020-02-03 NOTE — TELEPHONE ENCOUNTER
"Patient's wife answered the phone.  Notified that we were attempting to reach Mr. Ulloa regarding rescheduling an appointment that was cancelled.  Mrs. Ulloa states patient had a heart attack in December and all his pain was from that.  She reports he stated he doesn't need to follow up with anyone.  Notified he was referred for "hepatitis diagnosis."  Wife will notify Mr. Ulloa to see if he would like to follow up with the liver specialist.  JUANA Goldberg  "

## 2020-02-25 ENCOUNTER — DOCUMENTATION ONLY (OUTPATIENT)
Dept: GASTROENTEROLOGY | Facility: CLINIC | Age: 70
End: 2020-02-25

## 2020-02-25 NOTE — PROGRESS NOTES
Reviewed chart. Spoke with wife on 2/3/20 to have patient call to schedule an appointment r/t referral placed.  She states she will have patient call back.  Will await call back to continue following patient.  JUANA Goldberg

## 2020-03-03 ENCOUNTER — TELEPHONE (OUTPATIENT)
Dept: PULMONOLOGY | Facility: CLINIC | Age: 70
End: 2020-03-03

## 2020-03-03 NOTE — TELEPHONE ENCOUNTER
----- Message from Darron Jesus sent at 3/3/2020 11:29 AM CST -----  Contact: Pt wife Agustina  Pt wife Agustina call to make an appt for the pt    There is nothing available until 4/27/20    Pt is being referred by Dr. Nirmala Mills. Her office faxed the referral over yesterday    Agustina can be reached at [phone]

## 2020-03-05 ENCOUNTER — OFFICE VISIT (OUTPATIENT)
Dept: PULMONOLOGY | Facility: CLINIC | Age: 70
End: 2020-03-05
Payer: MEDICARE

## 2020-03-05 VITALS
TEMPERATURE: 99 F | WEIGHT: 167 LBS | OXYGEN SATURATION: 96 % | SYSTOLIC BLOOD PRESSURE: 138 MMHG | BODY MASS INDEX: 26.21 KG/M2 | DIASTOLIC BLOOD PRESSURE: 88 MMHG | RESPIRATION RATE: 19 BRPM | HEIGHT: 67 IN | HEART RATE: 74 BPM

## 2020-03-05 DIAGNOSIS — J92.9 PLEURAL THICKENING: Primary | ICD-10-CM

## 2020-03-05 DIAGNOSIS — R22.2 PLEURAL NODULES: ICD-10-CM

## 2020-03-05 DIAGNOSIS — Z77.090 ASBESTOS EXPOSURE: ICD-10-CM

## 2020-03-05 DIAGNOSIS — R91.8 OTHER NONSPECIFIC ABNORMAL FINDING OF LUNG FIELD: ICD-10-CM

## 2020-03-05 DIAGNOSIS — J44.9 COPD, MODERATE: ICD-10-CM

## 2020-03-05 DIAGNOSIS — Z87.891 FORMER SMOKER: ICD-10-CM

## 2020-03-05 PROCEDURE — 1101F PT FALLS ASSESS-DOCD LE1/YR: CPT | Mod: CPTII,S$GLB,, | Performed by: INTERNAL MEDICINE

## 2020-03-05 PROCEDURE — 1101F PR PT FALLS ASSESS DOC 0-1 FALLS W/OUT INJ PAST YR: ICD-10-PCS | Mod: CPTII,S$GLB,, | Performed by: INTERNAL MEDICINE

## 2020-03-05 PROCEDURE — 99205 PR OFFICE/OUTPT VISIT, NEW, LEVL V, 60-74 MIN: ICD-10-PCS | Mod: S$GLB,,, | Performed by: INTERNAL MEDICINE

## 2020-03-05 PROCEDURE — 99999 PR PBB SHADOW E&M-EST. PATIENT-LVL IV: ICD-10-PCS | Mod: PBBFAC,,, | Performed by: INTERNAL MEDICINE

## 2020-03-05 PROCEDURE — 1159F PR MEDICATION LIST DOCUMENTED IN MEDICAL RECORD: ICD-10-PCS | Mod: S$GLB,,, | Performed by: INTERNAL MEDICINE

## 2020-03-05 PROCEDURE — 99999 PR PBB SHADOW E&M-EST. PATIENT-LVL IV: CPT | Mod: PBBFAC,,, | Performed by: INTERNAL MEDICINE

## 2020-03-05 PROCEDURE — 99205 OFFICE O/P NEW HI 60 MIN: CPT | Mod: S$GLB,,, | Performed by: INTERNAL MEDICINE

## 2020-03-05 PROCEDURE — 1159F MED LIST DOCD IN RCRD: CPT | Mod: S$GLB,,, | Performed by: INTERNAL MEDICINE

## 2020-03-05 RX ORDER — TESTOSTERONE CYPIONATE 200 MG/ML
INJECTION, SOLUTION INTRAMUSCULAR
COMMUNITY
Start: 2020-02-18 | End: 2021-03-12

## 2020-03-05 RX ORDER — OMEPRAZOLE/SODIUM BICARBONATE 20MG-1.1G
CAPSULE ORAL
COMMUNITY
End: 2020-03-30

## 2020-03-05 RX ORDER — ALBUTEROL SULFATE 90 UG/1
2 AEROSOL, METERED RESPIRATORY (INHALATION) EVERY 6 HOURS PRN
Qty: 6.7 G | Refills: 11 | Status: SHIPPED | OUTPATIENT
Start: 2020-03-05

## 2020-03-05 RX ORDER — ASPIRIN 81 MG/1
81 TABLET ORAL DAILY
COMMUNITY
Start: 2019-12-12

## 2020-03-05 RX ORDER — TICAGRELOR 90 MG/1
90 TABLET ORAL 2 TIMES DAILY
COMMUNITY
Start: 2020-02-12 | End: 2021-03-12

## 2020-03-05 RX ORDER — METOPROLOL SUCCINATE 25 MG/1
25 TABLET, EXTENDED RELEASE ORAL DAILY
COMMUNITY
Start: 2020-03-03

## 2020-03-05 RX ORDER — NITROGLYCERIN 0.4 MG/1
0.4 TABLET SUBLINGUAL
COMMUNITY
Start: 2019-12-12

## 2020-03-05 RX ORDER — OXYCODONE HYDROCHLORIDE 20 MG/1
20 TABLET ORAL 4 TIMES DAILY
COMMUNITY
Start: 2020-02-18

## 2020-03-05 RX ORDER — ZOLPIDEM TARTRATE 10 MG/1
TABLET ORAL
COMMUNITY
Start: 2020-02-14 | End: 2021-03-12

## 2020-03-05 NOTE — LETTER
March 5, 2020      Nirmala Mills MD  [address]  [address]  [facility] [address]           [facility]  [address] [place] [id number]  Phone: [phone]  Fax: [phone]          Patient: Tanner Ulloa   MR Number: [id number]   YOB: 1950   Date of Visit: 3/5/2020       Dear Dr. Nirmala Mills:    Thank you for referring Tanner Ulloa to me for evaluation. Attached you will find relevant portions of my assessment and plan of care.    If you have questions, please do not hesitate to call me. I look forward to following Tanner Ulloa along with you.    Sincerely,    Zoe Garland MD    Enclosure  CC:  No Recipients    If you would like to receive this communication electronically, please contact [email or web address] or [phone] to request more information on [employer] Link access.    For providers and/or their staff who would like to refer a patient to [facility], please contact us through our one-stop-shop provider referral line, [facility], at [phone].    If you feel you have received this communication in error or would no longer like to receive these types of communications, please e-mail [email or web address]

## 2020-03-05 NOTE — PROGRESS NOTES
Initial Outpatient Pulmonary Evaluation       SUBJECTIVE:     Chief Complaint   Patient presents with    Pleural Effusion       History of Present Illness:    Patient is a 69 y.o. male referred for evaluation of pleural thickening pleural nodularity on CT chest 2019 February stable in December 2019 however concerning for mesothelioma.    Patient was evaluated as outpatient by a different pulmonology team who does visit the state for evaluation of patient's with history of exposure and at risk for mesothelioma.    He has had 2 CT scans February and December 2019 showing right-sided pleural thickening and pleural nodularity.    PFT done also in 2019 that showed moderate COPD.    Patient complain of shortness of breath on exertion, rare coughing and wheezing.    He does report about 13 lb weight loss over the last year.    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    He was recently evaluated by his cardiologist Dr. Mills he has 2 stents last 1 was done December 2019.    To be noted this patient has had a right-sided pleural effusion in 2016 evaluated at our office no evidence of malignancy    Previous patient evaluation at our practice and outpatient records reviewed. .      Outpatient records scan under media.  Review of Systems   Constitutional: Positive for weight loss, fatigue and weakness. Negative for fever and chills.   HENT: Negative for nosebleeds.    Eyes: Negative for redness.   Respiratory: Positive for cough, sputum production, shortness of breath, wheezing and dyspnea on extertion. Negative for choking.    Cardiovascular: Positive for chest pain.        CAD status post stents   Genitourinary: Negative for hematuria.   Endocrine: Negative for cold intolerance.    Musculoskeletal: Positive for arthralgias.   Skin: Negative for rash.   Gastrointestinal: Negative for vomiting.    Neurological: Negative for syncope.   Hematological: Negative for adenopathy.   Psychiatric/Behavioral: Negative for confusion. The patient is nervous/anxious.        Review of patient's allergies indicates:   Allergen Reactions    Dilaudid [hydromorphone (bulk)] Other (See Comments)     Severe headache    Hydromorphone     Statins-hmg-coa reductase inhibitors      Cramps is the reaction    Stadol [butorphanol tartrate] Palpitations    Sulfa (sulfonamide antibiotics) Rash    Talwin [pentazocine lactate] Palpitations       Current Outpatient Medications   Medication Sig Dispense Refill    aspirin (ECOTRIN) 81 MG EC tablet       BRILINTA 90 mg tablet       doxycycline (VIBRAMYCIN) 100 MG Cap Take 100 mg by mouth every 12 (twelve) hours.      evolocumab (REPATHA SURECLICK) 140 mg/mL PnIj Inject 1 Syringe into the skin.      FLUoxetine 40 MG capsule Take 40 mg by mouth once daily.      metoprolol succinate (TOPROL-XL) 25 MG 24 hr tablet       nitroGLYCERIN (NITROSTAT) 0.4 MG SL tablet       omeprazole-sodium bicarbonate (ZEGERID OTC) 20-1.1 mg-gram Cap Zegerid OTC   1 QD      omeprazole-sodium bicarbonate (ZEGERID) 40-1.1 mg-gram per capsule Take 1 capsule by mouth before breakfast.      oxyCODONE (ROXICODONE) 20 mg Tab immediate release tablet       testosterone cypionate (DEPOTESTOTERONE CYPIONATE) 200 mg/mL injection       testosterone cypionate 200 mg/mL Kit testosterone cypionate 200 mg/mL intramuscular oil   inject one half ML      zolpidem (AMBIEN) 10 mg Tab       albuterol (PROVENTIL/VENTOLIN HFA) 90 mcg/actuation inhaler Inhale 2 puffs into the lungs every 6 (six) hours as needed for Wheezing. Rescue 6.7 g 11    umeclidinium (INCRUSE ELLIPTA) 62.5 mcg/actuation DsDv Inhale 1 each into the lungs once daily. Controller 1 each 5     No current facility-administered medications for this visit.        Past Medical History:   Diagnosis Date    Anticoagulant long-term use     Cholelithiasis  "with choledocholithiasis     tx's with cholecystectomy and ERCP    Chronic back pain     GERD (gastroesophageal reflux disease)     Heart attack     Hepatitis C     History of Clostridium difficile infection     Hypertension     Irritable bowel syndrome     Testosterone deficiency      Past Surgical History:   Procedure Laterality Date    BACK SURGERY  2004    CHOLECYSTECTOMY  2013    COLONOSCOPY      CORONARY STENT PLACEMENT      ERCP      stent plaecement; then stone removal and stent replacement; then stent removal    KNEE ARTHRODESIS Right     UPPER GASTROINTESTINAL ENDOSCOPY       Family History   Problem Relation Age of Onset    Diverticulitis Father         required bowel resection    Colon cancer Neg Hx      Social History     Tobacco Use    Smoking status: Former Smoker     Packs/day: 2.00     Years: 40.00     Pack years: 80.00     Types: Cigarettes     Last attempt to quit: 2015     Years since quittin.0    Smokeless tobacco: Never Used   Substance Use Topics    Alcohol use: No    Drug use: No          OBJECTIVE:     Vital Signs (Most Recent)  Vital Signs  Temp: 98.8 °F (37.1 °C)  Pulse: 74  Resp: 19  SpO2: 96 %  BP: 138/88  Height and Weight  Height: 5' 7" (170.2 cm)  Weight: 75.8 kg (167 lb)  BSA (Calculated - sq m): 1.89 sq meters  BMI (Calculated): 26.1  Weight in (lb) to have BMI = 25: 159.3]  Wt Readings from Last 2 Encounters:   20 75.8 kg (167 lb)   19 77.5 kg (170 lb 13.7 oz)         Physical Exam:  Physical Exam   Constitutional: He is oriented to person, place, and time. He appears well-developed and well-nourished.   HENT:   Head: Normocephalic.   Neck: Neck supple.   Cardiovascular: Normal rate, regular rhythm and normal heart sounds.   Pulmonary/Chest: Normal expansion and effort normal. No stridor. No respiratory distress. He has decreased breath sounds. He exhibits no tenderness.   Abdominal: Soft. He exhibits no distension. "   Musculoskeletal: He exhibits no tenderness.   Lymphadenopathy:     He has no cervical adenopathy.   Neurological: He is alert and oriented to person, place, and time. Gait normal.   Skin: Skin is warm. No cyanosis. Nails show no clubbing.   Psychiatric: He has a normal mood and affect. His behavior is normal. Judgment and thought content normal.   Nursing note and vitals reviewed.      Laboratory  Lab Results   Component Value Date    WBC 9.83 12/09/2019    RBC 5.86 12/09/2019    HGB 16.8 12/09/2019    HCT 55.2 (H) 12/09/2019    MCV 94 12/09/2019    MCH 28.7 12/09/2019    MCHC 30.4 (L) 12/09/2019    RDW 14.7 (H) 12/09/2019     12/09/2019    MPV 9.6 12/09/2019    GRAN 7.1 12/09/2019    GRAN 71.7 12/09/2019    LYMPH 1.4 12/09/2019    LYMPH 14.2 (L) 12/09/2019    MONO 0.9 12/09/2019    MONO 9.5 12/09/2019    EOS 0.3 12/09/2019    BASO 0.07 12/09/2019    EOSINOPHIL 3.5 12/09/2019    BASOPHIL 0.7 12/09/2019       BMP  Lab Results   Component Value Date     (L) 12/09/2019    K 4.4 12/09/2019     12/09/2019    CO2 25 12/09/2019    BUN 19 12/09/2019    CREATININE 1.2 12/09/2019    CALCIUM 9.1 12/09/2019    ANIONGAP 9 12/09/2019    ESTGFRAFRICA >60.0 12/09/2019    EGFRNONAA >60.0 12/09/2019    AST 19 12/09/2019    AST 19 12/09/2019    ALT 20 12/09/2019    ALT 20 12/09/2019    PROT 7.3 12/09/2019    PROT 7.3 12/09/2019       No results found for: BNP    No results found for: TSH    No results found for: SEDRATE    No results found for: CRP    No results found for: IGE    No results found for: ASPERGILLUS  No results found for: AFUMIGATUSCL     No results found for: ACE    Diagnostic Results:    I have personally reviewed today the following studies :            Spirometry 2019 moderate COPD.    CT scan February 2019 and December 2019 pleural thickening, right lung volume loss, pleural nodularity concerning for mesothelioma.    Cytology from pleural effusion 2016 right-sided      FINAL PATHOLOGIC  DIAGNOSIS  1. Pleural fluid, right (tube):  Negative for malignant cells.  Inflammatory cells, predominantly lymphocytes, and occasional mesothelial cells.  2. Pleural fluid, right (cup):  Negative for malignant cells.  Inflammatory cells, predominantly lymphocytes, and occasional mesothelial cells.      ASSESSMENT/PLAN:     Pleural thickening  -     Ambulatory referral/consult to Cardiothoracic Surgery; Future; Expected date: 03/12/2020  -     NM PET CT Routine Skull to Mid Thigh; Future; Expected date: 03/05/2020    Pleural nodules  -     Ambulatory referral/consult to Cardiothoracic Surgery; Future; Expected date: 03/12/2020  -     NM PET CT Routine Skull to Mid Thigh; Future; Expected date: 03/05/2020    Other nonspecific abnormal finding of lung field   -     NM PET CT Routine Skull to Mid Thigh; Future; Expected date: 03/05/2020    Asbestos exposure  -     NM PET CT Routine Skull to Mid Thigh; Future; Expected date: 03/05/2020    COPD, moderate  -     albuterol (PROVENTIL/VENTOLIN HFA) 90 mcg/actuation inhaler; Inhale 2 puffs into the lungs every 6 (six) hours as needed for Wheezing. Rescue  Dispense: 6.7 g; Refill: 11  -     umeclidinium (INCRUSE ELLIPTA) 62.5 mcg/actuation DsDv; Inhale 1 each into the lungs once daily. Controller  Dispense: 1 each; Refill: 5    Former smoker     Start albuterol p.r.n. start long-acting anti muscarinic.    Encouraged patient to continue smoking cessation.    Suspicious finding on CT scan in favor of mesothelioma in the context of the patient significant exposure history and smoking history.    PET-CT scan and CT surgery evaluation for thoracoscopic biopsy.    MEDICAL DECISION MAKING: Moderate to high complexity.  Overall, the multiple problems listed are of moderate to high severity that may impact quality of life and activities of daily living. Side effects of medications, treatment plan as well as options and alternatives reviewed and discussed with patient. There was  counseling of patient concerning these issues.     TIME SPENT WITH PATIENT: Time spent: 60 minutes in face to face  discussion concerning diagnosis, prognosis, review of lab and test results, benefits of treatment as well as management of disease, counseling of patient and coordination of care between various health  care providers . Greater than half the time spent was used for coordination of care and counseling of patient.             Follow up in about 6 weeks (around 4/16/2020).    This note was prepared using voice recognition system and is likely to have sound alike errors that may have been overlooked even after proof reading.  Please call me with any questions    Discussed diagnosis, its evaluation, treatment and usual course. All questions answered.    Thank you for the courtesy of participating in the care of this patient    Zoe Garland MD

## 2020-03-06 ENCOUNTER — TELEPHONE (OUTPATIENT)
Dept: CARDIOTHORACIC SURGERY | Facility: CLINIC | Age: 70
End: 2020-03-06

## 2020-03-06 DIAGNOSIS — J90 PLEURAL EFFUSION: Primary | ICD-10-CM

## 2020-03-07 ENCOUNTER — PATIENT MESSAGE (OUTPATIENT)
Dept: PULMONOLOGY | Facility: CLINIC | Age: 70
End: 2020-03-07

## 2020-03-10 ENCOUNTER — TELEPHONE (OUTPATIENT)
Dept: RADIOLOGY | Facility: HOSPITAL | Age: 70
End: 2020-03-10

## 2020-03-11 ENCOUNTER — PATIENT MESSAGE (OUTPATIENT)
Dept: GASTROENTEROLOGY | Facility: CLINIC | Age: 70
End: 2020-03-11

## 2020-03-11 ENCOUNTER — CLINICAL SUPPORT (OUTPATIENT)
Dept: PULMONOLOGY | Facility: CLINIC | Age: 70
End: 2020-03-11
Payer: MEDICARE

## 2020-03-11 DIAGNOSIS — J90 PLEURAL EFFUSION: ICD-10-CM

## 2020-03-11 PROCEDURE — 94010 BREATHING CAPACITY TEST: ICD-10-PCS | Mod: S$GLB,,, | Performed by: INTERNAL MEDICINE

## 2020-03-11 PROCEDURE — 94010 BREATHING CAPACITY TEST: CPT | Mod: S$GLB,,, | Performed by: INTERNAL MEDICINE

## 2020-03-11 PROCEDURE — 94726 PULM FUNCT TST PLETHYSMOGRAP: ICD-10-PCS | Mod: S$GLB,,, | Performed by: INTERNAL MEDICINE

## 2020-03-11 PROCEDURE — 94729 DIFFUSING CAPACITY: CPT | Mod: S$GLB,,, | Performed by: INTERNAL MEDICINE

## 2020-03-11 PROCEDURE — 94729 PR C02/MEMBANE DIFFUSE CAPACITY: ICD-10-PCS | Mod: S$GLB,,, | Performed by: INTERNAL MEDICINE

## 2020-03-11 PROCEDURE — 94726 PLETHYSMOGRAPHY LUNG VOLUMES: CPT | Mod: S$GLB,,, | Performed by: INTERNAL MEDICINE

## 2020-03-12 LAB
BRPFT: ABNORMAL
DLCO ADJ PRE: 15.83 ML/(MIN*MMHG) (ref 17.73–31.59)
DLCO SINGLE BREATH LLN: 17.73
DLCO SINGLE BREATH PRE REF: 64.2 %
DLCO SINGLE BREATH REF: 24.66
DLCOC SBVA LLN: 2.56
DLCOC SBVA PRE REF: 100.7 %
DLCOC SBVA REF: 3.84
DLCOC SINGLE BREATH LLN: 17.73
DLCOC SINGLE BREATH PRE REF: 64.2 %
DLCOC SINGLE BREATH REF: 24.66
DLCOVA LLN: 2.56
DLCOVA PRE REF: 100.7 %
DLCOVA PRE: 3.87 ML/(MIN*MMHG*L) (ref 2.56–5.12)
DLCOVA REF: 3.84
DLVAADJ PRE: 3.87 ML/(MIN*MMHG*L) (ref 2.56–5.12)
ERV LLN: 1
ERV PRE REF: 116.4 %
ERV REF: 1
FEF 25 75 LLN: 1.02
FEF 25 75 PRE REF: 56.6 %
FEF 25 75 REF: 2.31
FEV1 FVC LLN: 64
FEV1 FVC PRE REF: 92.2 %
FEV1 FVC REF: 77
FEV1 LLN: 2.12
FEV1 PRE REF: 74.9 %
FEV1 REF: 2.92
FRCPLETH LLN: 2.49
FRCPLETH PREREF: 73.9 %
FRCPLETH REF: 3.48
FVC LLN: 2.84
FVC PRE REF: 81.1 %
FVC REF: 3.81
IVC PRE: 2.67 L (ref 2.84–4.78)
IVC SINGLE BREATH LLN: 2.84
IVC SINGLE BREATH PRE REF: 70.2 %
IVC SINGLE BREATH REF: 3.81
MVV LLN: 95
MVV PRE REF: 93.6 %
MVV REF: 112
PEF LLN: 5.7
PEF PRE REF: 102.5 %
PEF REF: 7.79
PRE DLCO: 15.83 ML/(MIN*MMHG) (ref 17.73–31.59)
PRE ERV: 1.16 L (ref 1–1)
PRE FEF 25 75: 1.31 L/S (ref 1.02–3.6)
PRE FET 100: 7.66 SEC
PRE FEV1 FVC: 70.79 % (ref 63.52–90.1)
PRE FEV1: 2.19 L (ref 2.12–3.72)
PRE FRC PL: 2.57 L
PRE FVC: 3.09 L (ref 2.84–4.78)
PRE MVV: 105 L/MIN (ref 95.37–129.03)
PRE PEF: 7.98 L/S (ref 5.7–9.88)
PRE RV: 1.02 L (ref 1.81–3.15)
PRE TLC: 4.33 L (ref 5.27–7.57)
RAW LLN: 3.06
RAW PRE REF: 176.6 %
RAW PRE: 5.4 CMH2O*S/L (ref 3.06–3.06)
RAW REF: 3.06
RV LLN: 1.81
RV PRE REF: 41.3 %
RV REF: 2.48
RVTLC LLN: 31
RVTLC PRE REF: 58.4 %
RVTLC PRE: 23.63 % (ref 31.5–49.46)
RVTLC REF: 40
TLC LLN: 5.27
TLC PRE REF: 67.4 %
TLC REF: 6.42
VA PRE: 4.1 L (ref 6.27–6.27)
VA SINGLE BREATH LLN: 6.27
VA SINGLE BREATH PRE REF: 65.4 %
VA SINGLE BREATH REF: 6.27
VC LLN: 2.84
VC PRE REF: 86.8 %
VC PRE: 3.31 L (ref 2.84–4.78)
VC REF: 3.81
VTGRAWPRE: 2.88 L

## 2020-03-13 ENCOUNTER — HOSPITAL ENCOUNTER (OUTPATIENT)
Dept: RADIOLOGY | Facility: HOSPITAL | Age: 70
Discharge: HOME | End: 2020-03-13
Attending: INTERNAL MEDICINE
Payer: MEDICARE

## 2020-03-13 DIAGNOSIS — J92.9 PLEURAL THICKENING: ICD-10-CM

## 2020-03-13 DIAGNOSIS — Z77.090 ASBESTOS EXPOSURE: ICD-10-CM

## 2020-03-13 DIAGNOSIS — R91.8 OTHER NONSPECIFIC ABNORMAL FINDING OF LUNG FIELD: ICD-10-CM

## 2020-03-13 DIAGNOSIS — R22.2 PLEURAL NODULES: ICD-10-CM

## 2020-03-13 PROCEDURE — 78815 PET IMAGE W/CT SKULL-THIGH: CPT | Mod: 26,PS,, | Performed by: RADIOLOGY

## 2020-03-13 PROCEDURE — 78815 PET IMAGE W/CT SKULL-THIGH: CPT | Mod: TC,PI

## 2020-03-13 PROCEDURE — A9552 F18 FDG: HCPCS

## 2020-03-13 PROCEDURE — 78815 NM PET CT ROUTINE: ICD-10-PCS | Mod: 26,PS,, | Performed by: RADIOLOGY

## 2020-03-14 ENCOUNTER — TELEPHONE (OUTPATIENT)
Dept: PULMONOLOGY | Facility: CLINIC | Age: 70
End: 2020-03-14

## 2020-03-14 ENCOUNTER — PATIENT MESSAGE (OUTPATIENT)
Dept: PULMONOLOGY | Facility: CLINIC | Age: 70
End: 2020-03-14

## 2020-03-16 ENCOUNTER — TELEPHONE (OUTPATIENT)
Dept: CARDIOTHORACIC SURGERY | Facility: HOSPITAL | Age: 70
End: 2020-03-16

## 2020-03-16 ENCOUNTER — PATIENT MESSAGE (OUTPATIENT)
Dept: PULMONOLOGY | Facility: CLINIC | Age: 70
End: 2020-03-16

## 2020-03-16 NOTE — TELEPHONE ENCOUNTER
Called patient and wife. Decision was made to cancel our [place] clinic at this time. Offered appointment with Dr. Cabello in [place] or to postpone clinic to later date. They are not willing to come to [place] at this time and do not want to schedule future appt. They will call our office if something changes.     CB

## 2020-03-17 ENCOUNTER — PATIENT MESSAGE (OUTPATIENT)
Dept: PULMONOLOGY | Facility: CLINIC | Age: 70
End: 2020-03-17

## 2020-03-19 ENCOUNTER — TELEPHONE (OUTPATIENT)
Dept: CARDIOTHORACIC SURGERY | Facility: HOSPITAL | Age: 70
End: 2020-03-19

## 2020-03-19 ENCOUNTER — TELEPHONE (OUTPATIENT)
Dept: PULMONOLOGY | Facility: CLINIC | Age: 70
End: 2020-03-19

## 2020-03-19 DIAGNOSIS — R91.8 OTHER NONSPECIFIC ABNORMAL FINDING OF LUNG FIELD: ICD-10-CM

## 2020-03-19 DIAGNOSIS — R22.2 PLEURAL NODULES: ICD-10-CM

## 2020-03-19 DIAGNOSIS — Z77.090 ASBESTOS EXPOSURE: ICD-10-CM

## 2020-03-19 DIAGNOSIS — J92.9 PLEURAL THICKENING: Primary | ICD-10-CM

## 2020-03-19 NOTE — TELEPHONE ENCOUNTER
Returned phone call to wife. We are unable to reschedule his appointment in [place] due to COVID-19 concerns. Mr. Ulloa has chronic pleural effusion, PET negative pleural thickening and history of asbestos exposure.  I gave the patient's wife the name and phone number of a thoracic surgeon in [place]. She'll call their office to ask about current COVID restrictions in [place]. Patient's wife was very understanding.        ----- Message from Katie Neal sent at 3/19/2020  8:57 AM CDT -----  Contact: PT's Wife - Agustina   Calling to reschedule the appointment that was cancelled on 3/17. Was told the doctor isn't going to [place] anymore so she said they'll go to [place]. Please call back     Callback: [phone]

## 2020-03-19 NOTE — TELEPHONE ENCOUNTER
----- Message from Yaharia Chua sent at 3/19/2020  1:24 PM CDT -----  Contact: wife, Agustina,   Would like to speak with office regarding getting a referral to Radha Garcia.  They are unable to see [place] dr at this time with the virus.   Radha Garcia's fax # [phone] office [phone]  Please call wife.

## 2020-03-19 NOTE — TELEPHONE ENCOUNTER
Spoke to patient wife will fax over medical notes and testing to Dr Radha Garcia. Att [facility] [phone]

## 2020-03-27 ENCOUNTER — PATIENT MESSAGE (OUTPATIENT)
Dept: PULMONOLOGY | Facility: CLINIC | Age: 70
End: 2020-03-27

## 2020-03-30 ENCOUNTER — TELEPHONE (OUTPATIENT)
Dept: GASTROENTEROLOGY | Facility: CLINIC | Age: 70
End: 2020-03-30

## 2020-03-30 ENCOUNTER — PATIENT MESSAGE (OUTPATIENT)
Dept: GASTROENTEROLOGY | Facility: CLINIC | Age: 70
End: 2020-03-30

## 2020-03-30 ENCOUNTER — OFFICE VISIT (OUTPATIENT)
Dept: GASTROENTEROLOGY | Facility: CLINIC | Age: 70
End: 2020-03-30
Payer: MEDICARE

## 2020-03-30 DIAGNOSIS — B18.2 CHRONIC HEPATITIS C WITHOUT HEPATIC COMA: ICD-10-CM

## 2020-03-30 DIAGNOSIS — R10.11 CHRONIC RUQ PAIN: ICD-10-CM

## 2020-03-30 DIAGNOSIS — R19.7 DIARRHEA, UNSPECIFIED TYPE: Primary | ICD-10-CM

## 2020-03-30 DIAGNOSIS — G89.29 CHRONIC RUQ PAIN: ICD-10-CM

## 2020-03-30 PROCEDURE — 1101F PR PT FALLS ASSESS DOC 0-1 FALLS W/OUT INJ PAST YR: ICD-10-PCS | Mod: CPTII,95,S$GLB, | Performed by: PHYSICIAN ASSISTANT

## 2020-03-30 PROCEDURE — 99213 OFFICE O/P EST LOW 20 MIN: CPT | Mod: 95,,, | Performed by: PHYSICIAN ASSISTANT

## 2020-03-30 PROCEDURE — 1159F PR MEDICATION LIST DOCUMENTED IN MEDICAL RECORD: ICD-10-PCS | Mod: 95,S$GLB,, | Performed by: PHYSICIAN ASSISTANT

## 2020-03-30 PROCEDURE — 1159F MED LIST DOCD IN RCRD: CPT | Mod: 95,S$GLB,, | Performed by: PHYSICIAN ASSISTANT

## 2020-03-30 PROCEDURE — 1101F PT FALLS ASSESS-DOCD LE1/YR: CPT | Mod: CPTII,95,S$GLB, | Performed by: PHYSICIAN ASSISTANT

## 2020-03-30 PROCEDURE — 99213 PR OFFICE/OUTPT VISIT, EST, LEVL III, 20-29 MIN: ICD-10-PCS | Mod: 95,,, | Performed by: PHYSICIAN ASSISTANT

## 2020-03-30 RX ORDER — HYOSCYAMINE SULFATE 0.12 MG/1
0.12 TABLET SUBLINGUAL EVERY 4 HOURS PRN
Qty: 90 TABLET | Refills: 1 | Status: SHIPPED | OUTPATIENT
Start: 2020-03-30 | End: 2020-04-13 | Stop reason: SDUPTHER

## 2020-03-30 RX ORDER — HYDROGEN PEROXIDE 3 %
20 SOLUTION, NON-ORAL MISCELLANEOUS EVERY OTHER DAY
COMMUNITY
End: 2021-03-12

## 2020-03-30 RX ORDER — CHOLESTYRAMINE 4 G/4.8G
4 POWDER, FOR SUSPENSION ORAL 2 TIMES DAILY
Qty: 60 PACKET | Refills: 1 | Status: SHIPPED | OUTPATIENT
Start: 2020-03-30 | End: 2020-06-03

## 2020-03-30 NOTE — PROGRESS NOTES
Subjective:      Patient ID: Tanner Ulloa is a 69 y.o. male.    Chief Complaint: Diarrhea    The patient location is: his daughter's home  The chief complaint leading to consultation is: diarrhea  Visit type: Virtual visit with synchronous audio and video  Total time spent with patient: 30 minutes  Each patient to whom he or she provides medical services by telemedicine is:  (1) informed of the relationship between the physician and patient and the respective role of any other health care provider with respect to management of the patient; and (2) notified that he or she may decline to receive medical services by telemedicine and may withdraw from such care at any time.      HPI   The patient has a history of chronic diarrhea, abdominal pain and HCV. He was last seen by Dr. Davis in December 2019 for the same. Stools studies and US ordered but the patient had a MI a few days later. He was unable to follow up until now. He complains of diarrhea up to 8 times a day. It rarely affects his sleep. It is urgent and he has had a few accidents. It seems worse with eating. However, he tends to tolerate dairy well. He reports good appetite but is scared to eat. His daughter says he has lost 20 pounds in the last seven months. He is treating the diarrhea with Kaopectate which helps. He denies hematochezia. When he eats, he also feels bloated and reports RUQ pain. He took his daughter's Levsin and it helped his pain. He has nausea but no vomiting. He had his gallbladder removed in 2013. He has Welchol 625 mg from 2016 which he takes occasionally. He can't take it daily because it causes constipation. He denies recent antibiotic use. He had C. Diff several years ago. He takes Oxycodone several times a day as prescribed. He takes Nexium every other day for reflux. He says the Nexium works well.     Colonoscopy (2015) - indication was diarrhea - polyps, internal hemorrhoids - random biopsies were ok.      He has history of  HCV. He had talked to Dr. Davis about treatment but he was unable to proceed when he had his MI. Last US was 2015. Last AFP was 12/2019 - 3.3.      Review of Systems  As per HPI.     Objective:     Physical Exam   Constitutional: He is oriented to person, place, and time. He appears well-developed and well-nourished. No distress.   HENT:   Head: Normocephalic and atraumatic.   Eyes: EOM are normal.   Neck: Normal range of motion.   Pulmonary/Chest: Effort normal. No respiratory distress.   Abdominal: He exhibits no distension.   Neurological: He is alert and oriented to person, place, and time. No cranial nerve deficit.   Psychiatric: His behavior is normal.       Assessment:     1. Diarrhea, unspecified type    2. Chronic RUQ pain    3. Chronic hepatitis C without hepatic coma        Plan:     Stool studies as preciously ordered by Dr. Davis.   US and labs for HCV surveillance. Will need to consider treatment in the near future.   Continue current dosing on Nexium.   Discussed dosing and possible side effects of medications. He was instructed to discuss cholestyramine dosing with his pharmacist because it may need to be spaced out from other medications. He will start with once daily and contact me in a week with an update.   Less likely but consider overflow diarrhea or mesenteric ischemia.     Medications Ordered This Encounter   Medications    cholestyramine-aspartame (CHOLESTYRAMINE LIGHT) 4 gram PwPk     Sig: Take 1 packet (4 g total) by mouth 2 (two) times daily. Start with one dose a day.     Dispense:  60 packet     Refill:  1    hyoscyamine (LEVSIN/SL) 0.125 mg Subl     Sig: Place 1 tablet (0.125 mg total) under the tongue every 4 (four) hours as needed.     Dispense:  90 tablet     Refill:  1       Orders Placed This Encounter   Procedures    X-Ray Abdomen Flat And Erect    Comprehensive metabolic panel    Protime-INR       Follow up if symptoms worsen or fail to improve.    Thank you for the  opportunity to participate in the care of this patient.   Gerardo Lyons PA-C.

## 2020-03-30 NOTE — PATIENT INSTRUCTIONS
Continue Nexium every other day.   Start Cholestyramine once daily. Update me after a week of use.   Levsin (Hyoscyamine) as needed for abdominal pain.

## 2020-03-30 NOTE — TELEPHONE ENCOUNTER
Spoke with wife regarding patient's appointment scheduled for today.  Received message stating they wanted a virtual visit but the appointment was never swapped over.  Requests a virtual visit, swapped over during our call.  Requesting I call her daughter who will be helping with the visit. [phone], called no answer vm left.  JUANA Goldberg

## 2020-04-02 ENCOUNTER — TELEPHONE (OUTPATIENT)
Dept: PULMONOLOGY | Facility: CLINIC | Age: 70
End: 2020-04-02

## 2020-04-03 ENCOUNTER — TELEPHONE (OUTPATIENT)
Dept: RADIOLOGY | Facility: HOSPITAL | Age: 70
End: 2020-04-03

## 2020-04-06 ENCOUNTER — TELEPHONE (OUTPATIENT)
Dept: RADIOLOGY | Facility: HOSPITAL | Age: 70
End: 2020-04-06

## 2020-04-07 ENCOUNTER — HOSPITAL ENCOUNTER (OUTPATIENT)
Dept: RADIOLOGY | Facility: HOSPITAL | Age: 70
Discharge: HOME | End: 2020-04-07
Attending: PHYSICIAN ASSISTANT
Payer: MEDICARE

## 2020-04-07 ENCOUNTER — PATIENT MESSAGE (OUTPATIENT)
Dept: GASTROENTEROLOGY | Facility: CLINIC | Age: 70
End: 2020-04-07

## 2020-04-07 ENCOUNTER — HOSPITAL ENCOUNTER (OUTPATIENT)
Dept: RADIOLOGY | Facility: HOSPITAL | Age: 70
Discharge: HOME | End: 2020-04-07
Attending: INTERNAL MEDICINE
Payer: MEDICARE

## 2020-04-07 DIAGNOSIS — G89.29 CHRONIC RUQ PAIN: ICD-10-CM

## 2020-04-07 DIAGNOSIS — B18.2 CHRONIC HEPATITIS C WITHOUT HEPATIC COMA: ICD-10-CM

## 2020-04-07 DIAGNOSIS — R19.7 DIARRHEA, UNSPECIFIED TYPE: ICD-10-CM

## 2020-04-07 DIAGNOSIS — R10.11 CHRONIC RUQ PAIN: ICD-10-CM

## 2020-04-07 PROCEDURE — 74019 RADEX ABDOMEN 2 VIEWS: CPT | Mod: TC

## 2020-04-07 PROCEDURE — 74019 XR ABDOMEN FLAT AND ERECT: ICD-10-PCS | Mod: 26,,, | Performed by: RADIOLOGY

## 2020-04-07 PROCEDURE — 74019 RADEX ABDOMEN 2 VIEWS: CPT | Mod: 26,,, | Performed by: RADIOLOGY

## 2020-04-07 PROCEDURE — 76705 ECHO EXAM OF ABDOMEN: CPT | Mod: TC

## 2020-04-07 PROCEDURE — 76705 ECHO EXAM OF ABDOMEN: CPT | Mod: 26,,, | Performed by: RADIOLOGY

## 2020-04-07 PROCEDURE — 76705 US ABDOMEN LIMITED: ICD-10-PCS | Mod: 26,,, | Performed by: RADIOLOGY

## 2020-04-07 NOTE — PROGRESS NOTES
Mr. Ulloa,     Your recent ultrasound showed no major changes. Good news! We recommend that you follow up with our liver clinic in about 2 months, to continue monitoring and surveillance.     Thanks,     Ricco Davis

## 2020-04-13 RX ORDER — HYOSCYAMINE SULFATE 0.12 MG/1
0.12 TABLET SUBLINGUAL EVERY 4 HOURS PRN
Qty: 90 TABLET | Refills: 1 | Status: SHIPPED | OUTPATIENT
Start: 2020-04-13 | End: 2020-05-12 | Stop reason: CLARIF

## 2020-04-20 ENCOUNTER — LAB VISIT (OUTPATIENT)
Dept: LAB | Facility: HOSPITAL | Age: 70
End: 2020-04-20
Attending: INTERNAL MEDICINE
Payer: MEDICARE

## 2020-04-20 DIAGNOSIS — R19.8 IRREGULAR BOWEL HABITS: ICD-10-CM

## 2020-04-20 LAB
C DIFF GDH STL QL: POSITIVE
C DIFF TOX A+B STL QL IA: NEGATIVE
C DIFF TOX GENS STL QL NAA+PROBE: NEGATIVE
WBC #/AREA STL HPF: NORMAL /[HPF]

## 2020-04-20 PROCEDURE — 87493 C DIFF AMPLIFIED PROBE: CPT

## 2020-04-20 PROCEDURE — 87324 CLOSTRIDIUM AG IA: CPT | Mod: 59

## 2020-04-20 PROCEDURE — 87209 SMEAR COMPLEX STAIN: CPT

## 2020-04-20 PROCEDURE — 83993 ASSAY FOR CALPROTECTIN FECAL: CPT

## 2020-04-20 PROCEDURE — 89055 LEUKOCYTE ASSESSMENT FECAL: CPT

## 2020-04-20 PROCEDURE — 83630 LACTOFERRIN FECAL (QUAL): CPT

## 2020-04-20 PROCEDURE — 87427 SHIGA-LIKE TOXIN AG IA: CPT

## 2020-04-20 PROCEDURE — 87449 NOS EACH ORGANISM AG IA: CPT

## 2020-04-20 PROCEDURE — 87046 STOOL CULTR AEROBIC BACT EA: CPT | Mod: 59

## 2020-04-20 PROCEDURE — 87338 HPYLORI STOOL AG IA: CPT

## 2020-04-20 PROCEDURE — 87045 FECES CULTURE AEROBIC BACT: CPT

## 2020-04-21 LAB
E COLI SXT1 STL QL IA: NEGATIVE
E COLI SXT2 STL QL IA: NEGATIVE
O+P STL MICRO: NORMAL

## 2020-04-22 ENCOUNTER — PATIENT MESSAGE (OUTPATIENT)
Dept: GASTROENTEROLOGY | Facility: CLINIC | Age: 70
End: 2020-04-22

## 2020-04-23 ENCOUNTER — OFFICE VISIT (OUTPATIENT)
Dept: GASTROENTEROLOGY | Facility: CLINIC | Age: 70
End: 2020-04-23
Payer: MEDICARE

## 2020-04-23 DIAGNOSIS — R76.8 HEPATITIS B CORE ANTIBODY POSITIVE: ICD-10-CM

## 2020-04-23 DIAGNOSIS — B18.2 CHRONIC HEPATITIS C WITHOUT HEPATIC COMA: Primary | ICD-10-CM

## 2020-04-23 LAB
BACTERIA STL CULT: NORMAL
CALPROTECTIN STL-MCNT: <27.1 MCG/G

## 2020-04-23 PROCEDURE — 99214 PR OFFICE/OUTPT VISIT, EST, LEVL IV, 30-39 MIN: ICD-10-PCS | Mod: 95,,, | Performed by: NURSE PRACTITIONER

## 2020-04-23 PROCEDURE — 1159F MED LIST DOCD IN RCRD: CPT | Mod: 95,,, | Performed by: NURSE PRACTITIONER

## 2020-04-23 PROCEDURE — 1101F PR PT FALLS ASSESS DOC 0-1 FALLS W/OUT INJ PAST YR: ICD-10-PCS | Mod: CPTII,95,, | Performed by: NURSE PRACTITIONER

## 2020-04-23 PROCEDURE — 1101F PT FALLS ASSESS-DOCD LE1/YR: CPT | Mod: CPTII,95,, | Performed by: NURSE PRACTITIONER

## 2020-04-23 PROCEDURE — 1159F PR MEDICATION LIST DOCUMENTED IN MEDICAL RECORD: ICD-10-PCS | Mod: 95,,, | Performed by: NURSE PRACTITIONER

## 2020-04-23 PROCEDURE — 99214 OFFICE O/P EST MOD 30 MIN: CPT | Mod: 95,,, | Performed by: NURSE PRACTITIONER

## 2020-04-23 NOTE — PROGRESS NOTES
Your stool studies looks fine and does not require any change in treatment. You still have positive C. Diff antigens but no spores producing toxins and the most reliable test, PCR for C. Diff is negative.     Please contact me if you have any additional concerns.    Sincerely,    Ricco Davis

## 2020-04-23 NOTE — PROGRESS NOTES
"Clinic Follow Up:  [facility] Follow Up Note    Reason for Follow Up:  The primary encounter diagnosis was Chronic hepatitis C without hepatic coma. A diagnosis of Hepatitis B core antibody positive was also pertinent to this visit.    PCP: Freddie Mondragon     The patient location is: [place]  The chief complaint leading to consultation is: HCV  Visit type: audiovisual  Total time spent with patient: 20 minutes  Each patient to whom he or she provides medical services by telemedicine is:  (1) informed of the relationship between the physician and patient and the respective role of any other health care provider with respect to management of the patient; and (2) notified that he or she may decline to receive medical services by telemedicine and may withdraw from such care at any time.    HPI:  This is a 69 y.o. male here for follow up of the above  Pt previously seen by Dr. Davis.   Today is my first visit with him  Pt has been under the care of Dr. Davis for abdominal cramping and diarrhea.  Workup is still in process  He states that he is overall feeling stable  He reports being told about viral hepatitis in the 1970's but has never had any specific treatment.   He denies any previous IVDU.  Has multiple tattoos from "at-home" tattoos in the 1970s  He denies any other known liver disease    No nausea or vomiting.  No melena or hematochezia. No weight loss.  No upper GI bleeding.  No ascites or BLE.  No overt confusion.  Previous workup showed a core Hep B ab positive as well.  No staging completed  US was stable with a small cyst.       Review of Systems   Constitutional: Positive for malaise/fatigue. Negative for chills, fever and weight loss.   Respiratory: Negative for cough.    Cardiovascular: Negative for chest pain.   Gastrointestinal:        Per HPI   Musculoskeletal: Negative for myalgias.   Skin: Negative for itching and rash.   Neurological: Negative for headaches. "   Psychiatric/Behavioral: The patient is not nervous/anxious.        Medical History:  Past Medical History:   Diagnosis Date    Anticoagulant long-term use     Cholelithiasis with choledocholithiasis     tx's with cholecystectomy and ERCP    Chronic back pain     GERD (gastroesophageal reflux disease)     Heart attack     Hepatitis C     History of Clostridium difficile infection     Hypertension     Irritable bowel syndrome     Testosterone deficiency        Surgical History:   Past Surgical History:   Procedure Laterality Date    BACK SURGERY  2004    CHOLECYSTECTOMY  2013    COLONOSCOPY      CORONARY STENT PLACEMENT      ERCP      stent plaecement; then stone removal and stent replacement; then stent removal    KNEE ARTHRODESIS Right     UPPER GASTROINTESTINAL ENDOSCOPY         Family History:   Family History   Problem Relation Age of Onset    Diverticulitis Father         required bowel resection    Colon cancer Neg Hx        Social History:   Social History     Tobacco Use    Smoking status: Former Smoker     Packs/day: 2.00     Years: 40.00     Pack years: 80.00     Types: Cigarettes     Last attempt to quit: 2015     Years since quittin.1    Smokeless tobacco: Never Used   Substance Use Topics    Alcohol use: No    Drug use: No       Allergies: Reviewed    Home Medications:  Current Outpatient Medications on File Prior to Visit   Medication Sig Dispense Refill    albuterol (PROVENTIL/VENTOLIN HFA) 90 mcg/actuation inhaler Inhale 2 puffs into the lungs every 6 (six) hours as needed for Wheezing. Rescue 6.7 g 11    aspirin (ECOTRIN) 81 MG EC tablet       BRILINTA 90 mg tablet       cholestyramine-aspartame (CHOLESTYRAMINE LIGHT) 4 gram PwPk Take 1 packet (4 g total) by mouth 2 (two) times daily. Start with one dose a day. 60 packet 1    esomeprazole (NEXIUM) 20 MG capsule Take 20 mg by mouth every other day.      evolocumab (REPATHA SURECLICK) 140 mg/mL  PnIj Inject 1 Syringe into the skin.      FLUoxetine 40 MG capsule Take 40 mg by mouth once daily.      hyoscyamine (LEVSIN/SL) 0.125 mg Subl Place 1 tablet (0.125 mg total) under the tongue every 4 (four) hours as needed. 90 tablet 1    metoprolol succinate (TOPROL-XL) 25 MG 24 hr tablet       nitroGLYCERIN (NITROSTAT) 0.4 MG SL tablet       oxyCODONE (ROXICODONE) 20 mg Tab immediate release tablet       testosterone cypionate (DEPOTESTOTERONE CYPIONATE) 200 mg/mL injection       umeclidinium (INCRUSE ELLIPTA) 62.5 mcg/actuation DsDv Inhale 1 each into the lungs once daily. Controller 1 each 5    zolpidem (AMBIEN) 10 mg Tab        No current facility-administered medications on file prior to visit.        Physical Exam:  Vital Signs:  There were no vitals taken for this visit.  There is no height or weight on file to calculate BMI.  Physical Exam   Constitutional: He appears well-developed.   HENT:   Head: Normocephalic.   Eyes: No scleral icterus.   Neck: Normal range of motion.   Pulmonary/Chest: Effort normal.   Musculoskeletal: Normal range of motion.   Neurological: He is alert.   Skin: Skin is dry.   Psychiatric: He has a normal mood and affect.       Labs: Pertinent labs reviewed.    Assessment:  1. Chronic hepatitis C without hepatic coma    2. Hepatitis B core antibody positive        Recommendations:  HCV  -Educated patient on HCV, natural history and treatment options  - Needs fibrosure for staging  - will need labs during treatment due to Hep B core positive ab  - will send prescription to [facility] after fibrosure complete.       Return to Clinic:    Follow up to be determined by results of above.

## 2020-04-24 ENCOUNTER — PATIENT MESSAGE (OUTPATIENT)
Dept: GASTROENTEROLOGY | Facility: CLINIC | Age: 70
End: 2020-04-24

## 2020-04-24 LAB — LACTOFERRIN, STOOL: NEGATIVE

## 2020-04-25 LAB — H PYLORI AG STL QL IA: NOT DETECTED

## 2020-04-27 ENCOUNTER — PATIENT MESSAGE (OUTPATIENT)
Dept: GASTROENTEROLOGY | Facility: CLINIC | Age: 70
End: 2020-04-27

## 2020-04-27 ENCOUNTER — LAB VISIT (OUTPATIENT)
Dept: LAB | Facility: HOSPITAL | Age: 70
End: 2020-04-27
Attending: NURSE PRACTITIONER
Payer: MEDICARE

## 2020-04-27 DIAGNOSIS — R76.8 HEPATITIS B CORE ANTIBODY POSITIVE: ICD-10-CM

## 2020-04-27 DIAGNOSIS — B18.2 CHRONIC HEPATITIS C WITHOUT HEPATIC COMA: ICD-10-CM

## 2020-04-27 PROCEDURE — 36415 COLL VENOUS BLD VENIPUNCTURE: CPT

## 2020-04-27 PROCEDURE — 81596 NFCT DS CHRNC HCV 6 ASSAYS: CPT

## 2020-04-27 RX ORDER — DICYCLOMINE HYDROCHLORIDE 20 MG/1
20 TABLET ORAL
Qty: 120 TABLET | Refills: 0 | Status: SHIPPED | OUTPATIENT
Start: 2020-04-27 | End: 2020-06-01 | Stop reason: SDUPTHER

## 2020-04-27 NOTE — PROGRESS NOTES
Your bloodwork looks fine and does not require any change in treatment.     Please contact me if you have any additional concerns.    Sincerely,    Ricco Davis

## 2020-04-30 ENCOUNTER — PATIENT MESSAGE (OUTPATIENT)
Dept: PULMONOLOGY | Facility: CLINIC | Age: 70
End: 2020-04-30

## 2020-04-30 ENCOUNTER — TELEPHONE (OUTPATIENT)
Dept: PULMONOLOGY | Facility: CLINIC | Age: 70
End: 2020-04-30

## 2020-04-30 LAB
A2 MACROGLOB SERPL-MCNC: 271 MG/DL (ref 100–280)
ALT SERPL W P-5'-P-CCNC: 25 U/L (ref 7–55)
APO A-I SERPL-MCNC: 108 MG/DL
BILIRUB SERPL-MCNC: 0.3 MG/DL
BIOPREDICTIVE SERIAL NUMBER: ABNORMAL
FIBROSIS STAGE SERPL QL: ABNORMAL
FIBROTEST INTERPRETATION: ABNORMAL
FIBROTEST-ACTITEST COMMENT: ABNORMAL
GGT SERPL-CCNC: 61 U/L (ref 8–61)
HAPTOGLOB SERPL-MCNC: 201 MG/DL (ref 30–200)
LIVER FIBR SCORE SERPL CALC.FIBROSURE: 0.5
NECROINFLAMMAT INTERP: ABNORMAL
NECROINFLAMMATORY ACT GRADE SERPL QL: ABNORMAL
NECROINFLAMMATORY ACT SCORE SERPL: 0.15

## 2020-05-04 ENCOUNTER — PATIENT MESSAGE (OUTPATIENT)
Dept: GASTROENTEROLOGY | Facility: CLINIC | Age: 70
End: 2020-05-04

## 2020-05-04 ENCOUNTER — TELEPHONE (OUTPATIENT)
Dept: PHARMACY | Facility: CLINIC | Age: 70
End: 2020-05-04

## 2020-05-04 DIAGNOSIS — B18.2 CHRONIC HEPATITIS C WITHOUT HEPATIC COMA: Primary | ICD-10-CM

## 2020-05-04 DIAGNOSIS — R76.8 HEPATITIS B CORE ANTIBODY POSITIVE: ICD-10-CM

## 2020-05-04 RX ORDER — VELPATASVIR AND SOFOSBUVIR 100; 400 MG/1; MG/1
1 TABLET, FILM COATED ORAL DAILY
Qty: 28 TABLET | Refills: 2 | Status: SHIPPED | OUTPATIENT
Start: 2020-05-04 | End: 2020-07-29

## 2020-05-04 NOTE — TELEPHONE ENCOUNTER
Epclusa PA submitted via CMDermApproved @ 4:28 pm. Key: [id number] LEBLANC Case ID: [id number]. CEB

## 2020-05-04 NOTE — TELEPHONE ENCOUNTER
Informed Spouse Agustina  that [facility] received prescription for Epclusa and prior authorization is required.  [facility] will be back in touch once insurance determination is received.

## 2020-05-07 NOTE — TELEPHONE ENCOUNTER
DOCUMENTATION ONLY:  Epclusa prior authorization approved x 12 weeks  Dates: 5/5/20 through 7/27/20  Case ID: [id number]   Co pay: $1,999.74  Co pay assistance IS required  Forwarded to [facility] Financial assistance team for review. CEB

## 2020-05-08 NOTE — TELEPHONE ENCOUNTER
Approved - FARRUKH    FOR DOCUMENTATION ONLY:    Financial Assistance for Epclusa approved from 2/8/2020 to 5/7/2021    Source: [employer]    Member ID: [id number]  Group ID: [id number]  RxBin ID: [id number]  PCN: [id number]    Amount: $ 6,800/year  -GOSIAYURIY

## 2020-05-11 ENCOUNTER — TELEPHONE (OUTPATIENT)
Dept: PHARMACY | Facility: CLINIC | Age: 70
End: 2020-05-11

## 2020-05-13 NOTE — TELEPHONE ENCOUNTER
Initial Medication Consultation: Epclusa    Initial Epclusa consult completed on  with patient and patient's wife. Epclusa will be shipped on  to arrive at patient's home on 5/15 via [employer]. $0 copay. Patient will start Epclusa on . Address confirmed. Confirmed 2 patient identifiers - name and . Therapy Appropriate.     Epclusa 400/100mg- Take one tablet by mouth daily x 12 weeks  Counseling was reviewed:   1. Patient MUST take Epclusa at the SAME time every day.   2. Patient MUST avoid acid reducers without consulting with myself or provider first. Antacids are to be spaced out at least 4 hours apart from Epclusa.    3. Potential Side effects include: headaches and fatigue.   Headache: Patient may treat with OTC remedies. If Tylenol is used, dose should not exceed 2000mg per day.    4. Medication list reviewed. Allergies reviewed. Patient MUST contact myself or provider prior to starting any new OTC, herbal, or prescription drugs to avoid potential DDIs.    DDI: Medication list reviewed and potential DDIs addressed.  - Epclusa and PPI NOT recommended - Mr. Ulloa is taking Nexium infrequently for GERD. He will STOP Nexium and take Rolaids  by Epclusa by at least 4 hours at patient's request.     Discussed the importance of staying well hydrated while on therapy. Compliance stressed - patient to take missed doses as soon as remembered, but NOT to take 2 doses in one day. Patient will report questions or concerns to myself or practitioner. Patient verbalizes understanding. Patient plans to start Epclusa on .  Consultation included: indication; goals of treatment; administration; storage and handling; side effects; how to handle side effects; the importance of compliance; how to handle missed doses; the importance of laboratory monitoring; the importance of keeping all follow up appointments.  Patient understands to report any medication changes to [facility] and provider. All questions answered and  addressed to patients satisfaction. I will f/u with her in 1 week from start, [facility] to contact patient in 3 weeks for refills.     Cheryle TempletonD, [facility]  Clinical Pharmacist   [facility]  Phone: [phone]

## 2020-05-18 ENCOUNTER — TELEPHONE (OUTPATIENT)
Dept: PULMONOLOGY | Facility: CLINIC | Age: 70
End: 2020-05-18

## 2020-05-18 ENCOUNTER — OFFICE VISIT (OUTPATIENT)
Dept: PULMONOLOGY | Facility: CLINIC | Age: 70
End: 2020-05-18
Payer: MEDICARE

## 2020-05-18 VITALS
HEART RATE: 65 BPM | HEIGHT: 67 IN | BODY MASS INDEX: 24.96 KG/M2 | WEIGHT: 159 LBS | OXYGEN SATURATION: 96 % | DIASTOLIC BLOOD PRESSURE: 68 MMHG | SYSTOLIC BLOOD PRESSURE: 137 MMHG

## 2020-05-18 DIAGNOSIS — Z77.090 ASBESTOS EXPOSURE: ICD-10-CM

## 2020-05-18 DIAGNOSIS — J92.9 PLEURAL THICKENING: Primary | ICD-10-CM

## 2020-05-18 DIAGNOSIS — Z87.891 FORMER SMOKER: ICD-10-CM

## 2020-05-18 DIAGNOSIS — J44.9 COPD, MODERATE: ICD-10-CM

## 2020-05-18 PROCEDURE — 1159F PR MEDICATION LIST DOCUMENTED IN MEDICAL RECORD: ICD-10-PCS | Mod: 95,,, | Performed by: NURSE PRACTITIONER

## 2020-05-18 PROCEDURE — 99214 PR OFFICE/OUTPT VISIT, EST, LEVL IV, 30-39 MIN: ICD-10-PCS | Mod: 95,,, | Performed by: NURSE PRACTITIONER

## 2020-05-18 PROCEDURE — 1101F PR PT FALLS ASSESS DOC 0-1 FALLS W/OUT INJ PAST YR: ICD-10-PCS | Mod: CPTII,95,, | Performed by: NURSE PRACTITIONER

## 2020-05-18 PROCEDURE — 1101F PT FALLS ASSESS-DOCD LE1/YR: CPT | Mod: CPTII,95,, | Performed by: NURSE PRACTITIONER

## 2020-05-18 PROCEDURE — 99214 OFFICE O/P EST MOD 30 MIN: CPT | Mod: 95,,, | Performed by: NURSE PRACTITIONER

## 2020-05-18 PROCEDURE — 1159F MED LIST DOCD IN RCRD: CPT | Mod: 95,,, | Performed by: NURSE PRACTITIONER

## 2020-05-18 NOTE — PROGRESS NOTES
"Subjective:      Patient ID: Tanner Ulloa is a 69 y.o. male.    Chief Complaint: Pleural Effusion  The patient location is: Home  The chief complaint leading to consultation is: home  Visit type: Virtual visit with synchronous audio and video  Total time spent with patient: 5973-9353   Each patient to whom he or she provides medical services by telemedicine is:  (1) informed of the relationship between the physician and patient and the respective role of any other health care provider with respect to management of the patient; and (2) notified that he or she may decline to receive medical services by telemedicine and may withdraw from such care at any time.    HPI  Patient of Dr. Garland with pleural thickening.  pleural nodularity on CT chest 2019 February stable in December 2019 however concerning for mesothelioma.  PFT done also in 2019 that showed moderate obstruction.  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  To be noted this patient has had a right-sided pleural effusion in 2016 evaluated at our office no evidence of malignancy.  He saw Dr. Garcia in March and she discussed with him following up in 2 weeks to discuss plan for work up. He has not heard back. I advised that he call the office to follow up. I am also requesting progress note.   Benefit from albuterol and using often. Not taking Incruse because he did not feel immediate relief. I explained action of medication and he will start back.  No fevers, wheezing.     Chronic diarrhea with weight loss. Following with GI.       Patient Active Problem List   Diagnosis    Irregular bowel habits    Gastritis    Colon polyp    Pleural effusion    Hepatitis C           /68   Pulse 65   Ht 5' 7" (1.702 m)   Wt 72.1 kg (159 lb)   SpO2 96%   BMI 24.90 kg/m²   Body mass index is 24.9 kg/m².    Review of Systems   Constitutional: Positive for weight loss.   Respiratory: " Positive for dyspnea on extertion.    Gastrointestinal:        Diarrhea    All other systems reviewed and are negative.    Objective:      Physical Exam   Constitutional: He is oriented to person, place, and time. He appears well-developed and well-nourished.   HENT:   Head: Normocephalic and atraumatic.   Right Ear: External ear normal.   Left Ear: External ear normal.   Nose: Nose normal.   Eyes: Conjunctivae and lids are normal.   Neck: Normal range of motion.   Pulmonary/Chest: Effort normal. No accessory muscle usage. No tachypnea and no bradypnea. No respiratory distress.   Neurological: He is alert and oriented to person, place, and time.   Skin: No rash noted.   Psychiatric: He has a normal mood and affect. His behavior is normal. Judgment and thought content normal.     Personal Diagnostic Review          Assessment:       1. Pleural thickening    2. Asbestos exposure    3. COPD, moderate    4. Former smoker        Outpatient Encounter Medications as of 5/18/2020   Medication Sig Dispense Refill    albuterol (PROVENTIL/VENTOLIN HFA) 90 mcg/actuation inhaler Inhale 2 puffs into the lungs every 6 (six) hours as needed for Wheezing. Rescue 6.7 g 11    aspirin (ECOTRIN) 81 MG EC tablet       BRILINTA 90 mg tablet       cholestyramine-aspartame (CHOLESTYRAMINE LIGHT) 4 gram PwPk Take 1 packet (4 g total) by mouth 2 (two) times daily. Start with one dose a day. 60 packet 1    dicyclomine (BENTYL) 20 mg tablet Take 1 tablet (20 mg total) by mouth 4 (four) times daily before meals and nightly.  tablet 0    esomeprazole (NEXIUM) 20 MG capsule Take 20 mg by mouth every other day.      evolocumab (REPATHA SURECLICK) 140 mg/mL PnIj Inject 1 Syringe into the skin.      FLUoxetine 40 MG capsule Take 40 mg by mouth once daily.      metoprolol succinate (TOPROL-XL) 25 MG 24 hr tablet       nitroGLYCERIN (NITROSTAT) 0.4 MG SL tablet       oxyCODONE (ROXICODONE) 20 mg Tab immediate release tablet        sofosbuvir-velpatasvir (EPCLUSA) 400-100 mg Tab Take 1 tablet by mouth once daily. 28 tablet 2    testosterone cypionate (DEPOTESTOTERONE CYPIONATE) 200 mg/mL injection       umeclidinium (INCRUSE ELLIPTA) 62.5 mcg/actuation DsDv Inhale 1 each into the lungs once daily. Controller 1 each 5    zolpidem (AMBIEN) 10 mg Tab       [DISCONTINUED] hyoscyamine (LEVSIN/SL) 0.125 mg Subl Place 1 tablet (0.125 mg total) under the tongue every 4 (four) hours as needed. 90 tablet 1     No facility-administered encounter medications on file as of 5/18/2020.      No orders of the defined types were placed in this encounter.    Plan:   Obtain note from Dr. Garcia.  Follow up Dr. Garcia.     Problem List Items Addressed This Visit     None      Visit Diagnoses     Pleural thickening    -  Primary    Asbestos exposure        COPD, moderate        Former smoker          Total time spent in face to face counseling and coordination of care -  20minutes over 50% of time was used in discussion of prognosis, risks, benefits of treatment, instructions and compliance with regimen . Discussion with other physicians or health care providers occurred.

## 2020-05-18 NOTE — Clinical Note
I don't see her progress note. Requested. He states he did virtual visit in march and she wanted to get back with him in 2 weeks but did not. I told them to call her office today and follow up.

## 2020-05-19 ENCOUNTER — TELEPHONE (OUTPATIENT)
Dept: PULMONOLOGY | Facility: CLINIC | Age: 70
End: 2020-05-19

## 2020-05-20 ENCOUNTER — DOCUMENTATION ONLY (OUTPATIENT)
Dept: PULMONOLOGY | Facility: CLINIC | Age: 70
End: 2020-05-20

## 2020-05-20 NOTE — PROGRESS NOTES
Phoned pt to see if he followed up with paul at [facility]. No answer will call again at later time

## 2020-05-21 ENCOUNTER — PATIENT MESSAGE (OUTPATIENT)
Dept: PULMONOLOGY | Facility: CLINIC | Age: 70
End: 2020-05-21

## 2020-05-22 ENCOUNTER — TELEPHONE (OUTPATIENT)
Dept: PHARMACY | Facility: CLINIC | Age: 70
End: 2020-05-22

## 2020-05-22 NOTE — TELEPHONE ENCOUNTER
Initial touch base conducted for Epclusa - Name/ confirmed with patient's wife, Ms. Berrios.  Confirmed patient started medication as instructed on .  Patient confirms that they are taking Epclusa every day at 8 AM.  Patient denies skipping or missing doses.  Patient reports experiencing no side effects since beginning therapy. Patient reports no new medications, otc remedies, or allergies. Mr. Ulloa STOPPED Nexium and is taking Rolaids  by Epclusa by at least 4 hours, if needed for GERD.  Overall, he is not having a lot of GERD and is tolerating the Rolaids. Patient reminded of lab appointments.  Patient counseled on importance of maintaining adherence and keeping lab appointments which were scheduled.  Advised to call [facility] and provider if any issues arise.  No questions or concerns. Aware [facility] will call for refills when patient has 7 days of medication on hand.

## 2020-05-27 NOTE — TELEPHONE ENCOUNTER
"Incoming call from patient and wife stating that he is experiencing extreme fatigue, SOB, and diarrhea. He has been experiencing diarrhea for a couple months now (before beginning Epclusa) but states a stool softener usually helps. Patient states the fatigue started to become an issue about the same time he started taking Epclusa. He also believes the SOB comes from being so tired; he is "so tired, it's hard to breathe" - patient also suffers from lung issues and is on Incruse Ellipta and Albuterol inhaler PRN. Patent's wife stated that the patient looks very sick and that he can barely put one foot in front of the other. Wife DENIES that the patient is experiencing fever/lost of taste and smell and that he had any potential exposures to COVID-19. She states that the patient does NOT leave the house. Wife confirms that patient remains hydrated and drinks plenty of water throughout the day. Wife and patient advised that it could be the Epclusa causing the fatigue since that is a well known side effect. They were also advised that the oxycodone, ambien, and fluoxetine could also be causing factors of fatigue, but wife states the patient has been on those medications for a while and has never had any problems. Wife also confirmed the patient did NOT begin any new medications. Wife then stated that she is unsure if the patient would be able to continue Epclusa for HCV treatment because of how tired he is. [facility] then discussed the potential failure to cure HCV if the patient DCs treatment. Wife verbalized understanding. She then requested for [facility] to message provider to have her reach out to patient and wife to discuss these issues. Will message provider and continue to f/u. Of note, patient did NOT report any of these issues during 7 day touch base call on 5/22.   "

## 2020-05-31 ENCOUNTER — PATIENT MESSAGE (OUTPATIENT)
Dept: GASTROENTEROLOGY | Facility: CLINIC | Age: 70
End: 2020-05-31

## 2020-06-01 RX ORDER — DICYCLOMINE HYDROCHLORIDE 20 MG/1
20 TABLET ORAL
Qty: 120 TABLET | Refills: 0 | Status: SHIPPED | OUTPATIENT
Start: 2020-06-01 | End: 2020-06-03 | Stop reason: SDUPTHER

## 2020-06-03 ENCOUNTER — OFFICE VISIT (OUTPATIENT)
Dept: GASTROENTEROLOGY | Facility: CLINIC | Age: 70
End: 2020-06-03
Payer: MEDICARE

## 2020-06-03 ENCOUNTER — TELEPHONE (OUTPATIENT)
Dept: GASTROENTEROLOGY | Facility: CLINIC | Age: 70
End: 2020-06-03

## 2020-06-03 ENCOUNTER — LAB VISIT (OUTPATIENT)
Dept: LAB | Facility: HOSPITAL | Age: 70
End: 2020-06-03
Attending: FAMILY MEDICINE
Payer: MEDICARE

## 2020-06-03 VITALS
SYSTOLIC BLOOD PRESSURE: 138 MMHG | DIASTOLIC BLOOD PRESSURE: 76 MMHG | WEIGHT: 161.38 LBS | OXYGEN SATURATION: 98 % | HEART RATE: 76 BPM | HEIGHT: 67 IN | BODY MASS INDEX: 25.33 KG/M2

## 2020-06-03 DIAGNOSIS — R10.84 GENERALIZED ABDOMINAL PAIN: ICD-10-CM

## 2020-06-03 DIAGNOSIS — K52.9 CHRONIC DIARRHEA: ICD-10-CM

## 2020-06-03 DIAGNOSIS — K58.0 IRRITABLE BOWEL SYNDROME WITH DIARRHEA: ICD-10-CM

## 2020-06-03 DIAGNOSIS — B18.2 CHRONIC HEPATITIS C WITHOUT HEPATIC COMA: ICD-10-CM

## 2020-06-03 DIAGNOSIS — Z86.010 HISTORY OF COLON POLYPS: ICD-10-CM

## 2020-06-03 DIAGNOSIS — K58.0 IRRITABLE BOWEL SYNDROME WITH DIARRHEA: Primary | ICD-10-CM

## 2020-06-03 PROCEDURE — 1159F PR MEDICATION LIST DOCUMENTED IN MEDICAL RECORD: ICD-10-PCS | Mod: S$GLB,,, | Performed by: PHYSICIAN ASSISTANT

## 2020-06-03 PROCEDURE — 83516 IMMUNOASSAY NONANTIBODY: CPT

## 2020-06-03 PROCEDURE — 99999 PR PBB SHADOW E&M-EST. PATIENT-LVL IV: ICD-10-PCS | Mod: PBBFAC,,, | Performed by: PHYSICIAN ASSISTANT

## 2020-06-03 PROCEDURE — 1101F PT FALLS ASSESS-DOCD LE1/YR: CPT | Mod: CPTII,S$GLB,, | Performed by: PHYSICIAN ASSISTANT

## 2020-06-03 PROCEDURE — 80048 BASIC METABOLIC PNL TOTAL CA: CPT

## 2020-06-03 PROCEDURE — 82784 ASSAY IGA/IGD/IGG/IGM EACH: CPT

## 2020-06-03 PROCEDURE — 1126F PR PAIN SEVERITY QUANTIFIED, NO PAIN PRESENT: ICD-10-PCS | Mod: S$GLB,,, | Performed by: PHYSICIAN ASSISTANT

## 2020-06-03 PROCEDURE — 99214 PR OFFICE/OUTPT VISIT, EST, LEVL IV, 30-39 MIN: ICD-10-PCS | Mod: S$GLB,,, | Performed by: PHYSICIAN ASSISTANT

## 2020-06-03 PROCEDURE — 99214 OFFICE O/P EST MOD 30 MIN: CPT | Mod: S$GLB,,, | Performed by: PHYSICIAN ASSISTANT

## 2020-06-03 PROCEDURE — 1159F MED LIST DOCD IN RCRD: CPT | Mod: S$GLB,,, | Performed by: PHYSICIAN ASSISTANT

## 2020-06-03 PROCEDURE — 99999 PR PBB SHADOW E&M-EST. PATIENT-LVL IV: CPT | Mod: PBBFAC,,, | Performed by: PHYSICIAN ASSISTANT

## 2020-06-03 PROCEDURE — 1126F AMNT PAIN NOTED NONE PRSNT: CPT | Mod: S$GLB,,, | Performed by: PHYSICIAN ASSISTANT

## 2020-06-03 PROCEDURE — 1101F PR PT FALLS ASSESS DOC 0-1 FALLS W/OUT INJ PAST YR: ICD-10-PCS | Mod: CPTII,S$GLB,, | Performed by: PHYSICIAN ASSISTANT

## 2020-06-03 PROCEDURE — 36415 COLL VENOUS BLD VENIPUNCTURE: CPT

## 2020-06-03 RX ORDER — DICYCLOMINE HYDROCHLORIDE 20 MG/1
20 TABLET ORAL
Qty: 120 TABLET | Refills: 0 | Status: SHIPPED | OUTPATIENT
Start: 2020-06-03 | End: 2020-07-03

## 2020-06-03 RX ORDER — PROMETHAZINE HYDROCHLORIDE 12.5 MG/1
12.5 TABLET ORAL EVERY 6 HOURS PRN
Qty: 22 TABLET | Refills: 0 | Status: SHIPPED | OUTPATIENT
Start: 2020-06-03

## 2020-06-03 RX ORDER — SODIUM, POTASSIUM,MAG SULFATES 17.5-3.13G
SOLUTION, RECONSTITUTED, ORAL ORAL
Qty: 354 ML | Refills: 0 | Status: ON HOLD | OUTPATIENT
Start: 2020-06-03 | End: 2020-06-11 | Stop reason: HOSPADM

## 2020-06-03 NOTE — TELEPHONE ENCOUNTER
Called patient to inform him of why we needed to see him here in the Liver clinic. He stated he was not taking any of the liver medication for his Hep C until the diarrhea was better controlled. I informed him that Gerardo would see him. They verbalized understanding.

## 2020-06-03 NOTE — PATIENT INSTRUCTIONS
Continue daily probiotic.   Continue Bentyl (Dicyclomine) as need before meals. This was refilled.   Continue Metamucil twice a day.   Phenergan for nausea as needed. This was refilled.

## 2020-06-03 NOTE — TELEPHONE ENCOUNTER
Called patient and informed the difference between both providers also patient informed he wanted to see Gerardo.

## 2020-06-03 NOTE — PROGRESS NOTES
Subjective:      Patient ID: Tanner Ulloa is a 69 y.o. male.    Chief Complaint: Diarrhea    HPI  The patient has a history of chronic diarrhea and abdominal pain for years. He reports worsening lately. He thinks Epclusa may have made it worse and stopped it. He is having more than 3 BMs a day and sometimes has that many in one hour. He feels eating makes his pain and diarrhea come on instantly. The diarrhea is affecting his sleep most nights. He is taking Metamucil and a Probiotic which have helped some. He tried cholesytramine but it makes his abdominal pain worse and causes constipation. He has tried decreasing the dose with the same results. Bentyl has helped with the cramping more than Levsin. He is requesting a refill along with a refill of Phenergan. He has nausea but no vomiting, hematochezia or melena.     Colonoscopy (03/09/15) - 5 mm polyp, internal hemorrhoids - repeat 5 years  EGD (03/09/15) - esophageal changes suspicious for Cruz's without biopsy evidence. Gastritis with hemorrhage.     Review of Systems  As per HPI.     Objective:     Physical Exam   Constitutional: He is oriented to person, place, and time. He appears well-developed and well-nourished. No distress.   HENT:   Head: Normocephalic and atraumatic.   Eyes: EOM are normal.   Cardiovascular: Normal rate and regular rhythm.   Pulmonary/Chest: Effort normal and breath sounds normal. No respiratory distress. He has no wheezes.   Abdominal: Soft. Bowel sounds are normal. He exhibits no distension. There is no tenderness.   Neurological: He is alert and oriented to person, place, and time. No cranial nerve deficit.   Skin: He is not diaphoretic.   Psychiatric: His behavior is normal.       Assessment:     1. Irritable bowel syndrome with diarrhea    2. History of colon polyps    3. Chronic hepatitis C without hepatic coma    4. Generalized abdominal pain    5. Chronic diarrhea        Plan:     He is due for a colonoscopy so I will  order this.   Celiac testing.   CTA to rule out mesenteric ischemia.   Consider testing for bacterial overgrowth.     Orders Placed This Encounter   Procedures    CTA Abdomen and Pelvis    COVID-19 Routine Screening    Tissue transglutaminase, IgA    IgA    Basic metabolic panel       Follow up in about 4 weeks (around 7/1/2020).    Thank you for the opportunity to participate in the care of this patient.   Gerardo Lyons PA-C.

## 2020-06-03 NOTE — TELEPHONE ENCOUNTER
Faxed cardiac clearance to Dr Nirmala Mills with [facility] in [place], pt had heart attack Dec 2019 on Brilinta.    [phone]  Fax [phone]

## 2020-06-04 ENCOUNTER — OFFICE VISIT (OUTPATIENT)
Dept: PULMONOLOGY | Facility: CLINIC | Age: 70
End: 2020-06-04
Payer: MEDICARE

## 2020-06-04 ENCOUNTER — TELEPHONE (OUTPATIENT)
Dept: RADIOLOGY | Facility: HOSPITAL | Age: 70
End: 2020-06-04

## 2020-06-04 ENCOUNTER — TELEPHONE (OUTPATIENT)
Dept: ENDOSCOPY | Facility: HOSPITAL | Age: 70
End: 2020-06-04

## 2020-06-04 VITALS
SYSTOLIC BLOOD PRESSURE: 130 MMHG | TEMPERATURE: 99 F | OXYGEN SATURATION: 95 % | WEIGHT: 156.75 LBS | HEART RATE: 81 BPM | BODY MASS INDEX: 24.6 KG/M2 | DIASTOLIC BLOOD PRESSURE: 76 MMHG | RESPIRATION RATE: 18 BRPM | HEIGHT: 67 IN

## 2020-06-04 DIAGNOSIS — R22.2 PLEURAL NODULES: Primary | ICD-10-CM

## 2020-06-04 DIAGNOSIS — J44.9 COPD, MODERATE: ICD-10-CM

## 2020-06-04 DIAGNOSIS — Z95.5 HX OF HEART ARTERY STENT: ICD-10-CM

## 2020-06-04 DIAGNOSIS — J92.9 PLEURAL THICKENING: ICD-10-CM

## 2020-06-04 DIAGNOSIS — Z87.891 FORMER SMOKER: ICD-10-CM

## 2020-06-04 LAB
ANION GAP SERPL CALC-SCNC: 11 MMOL/L (ref 8–16)
BUN SERPL-MCNC: 18 MG/DL (ref 8–23)
CALCIUM SERPL-MCNC: 9.7 MG/DL (ref 8.7–10.5)
CHLORIDE SERPL-SCNC: 101 MMOL/L (ref 95–110)
CO2 SERPL-SCNC: 26 MMOL/L (ref 23–29)
CREAT SERPL-MCNC: 1.4 MG/DL (ref 0.5–1.4)
EST. GFR  (AFRICAN AMERICAN): 59.3 ML/MIN/1.73 M^2
EST. GFR  (NON AFRICAN AMERICAN): 51.3 ML/MIN/1.73 M^2
GLUCOSE SERPL-MCNC: 95 MG/DL (ref 70–110)
IGA SERPL-MCNC: 137 MG/DL (ref 40–350)
POTASSIUM SERPL-SCNC: 4.6 MMOL/L (ref 3.5–5.1)
SODIUM SERPL-SCNC: 138 MMOL/L (ref 136–145)

## 2020-06-04 PROCEDURE — 99999 PR PBB SHADOW E&M-EST. PATIENT-LVL III: ICD-10-PCS | Mod: PBBFAC,,, | Performed by: INTERNAL MEDICINE

## 2020-06-04 PROCEDURE — 1159F PR MEDICATION LIST DOCUMENTED IN MEDICAL RECORD: ICD-10-PCS | Mod: S$GLB,,, | Performed by: INTERNAL MEDICINE

## 2020-06-04 PROCEDURE — 99214 OFFICE O/P EST MOD 30 MIN: CPT | Mod: S$GLB,,, | Performed by: INTERNAL MEDICINE

## 2020-06-04 PROCEDURE — 99214 PR OFFICE/OUTPT VISIT, EST, LEVL IV, 30-39 MIN: ICD-10-PCS | Mod: S$GLB,,, | Performed by: INTERNAL MEDICINE

## 2020-06-04 PROCEDURE — 1101F PT FALLS ASSESS-DOCD LE1/YR: CPT | Mod: CPTII,S$GLB,, | Performed by: INTERNAL MEDICINE

## 2020-06-04 PROCEDURE — 99999 PR PBB SHADOW E&M-EST. PATIENT-LVL III: CPT | Mod: PBBFAC,,, | Performed by: INTERNAL MEDICINE

## 2020-06-04 PROCEDURE — 1101F PR PT FALLS ASSESS DOC 0-1 FALLS W/OUT INJ PAST YR: ICD-10-PCS | Mod: CPTII,S$GLB,, | Performed by: INTERNAL MEDICINE

## 2020-06-04 PROCEDURE — 1159F MED LIST DOCD IN RCRD: CPT | Mod: S$GLB,,, | Performed by: INTERNAL MEDICINE

## 2020-06-04 RX ORDER — CHOLESTYRAMINE 4 G/4.8G
4 POWDER, FOR SUSPENSION ORAL
COMMUNITY
Start: 2020-03-30 | End: 2021-03-12

## 2020-06-04 NOTE — TELEPHONE ENCOUNTER
Interventional Radiology:  Lung bx appt made for 6/12 at 0800, notified Dr. Garland via secure chat to have nurse call patient and notify, order Covid screen, and patient to be off blood thinner 5 days prior.

## 2020-06-04 NOTE — TELEPHONE ENCOUNTER
Attempted to schedule procedure with patient, he declined at this time.  Stated he would call office when ready to schedule.

## 2020-06-04 NOTE — TELEPHONE ENCOUNTER
Informed pt wife of procedure date and time and instructed her that last alexander of BRILINTA to be Saturday 6/6 .

## 2020-06-05 ENCOUNTER — PATIENT MESSAGE (OUTPATIENT)
Dept: PULMONOLOGY | Facility: CLINIC | Age: 70
End: 2020-06-05

## 2020-06-05 ENCOUNTER — TELEPHONE (OUTPATIENT)
Dept: GASTROENTEROLOGY | Facility: CLINIC | Age: 70
End: 2020-06-05

## 2020-06-05 NOTE — TELEPHONE ENCOUNTER
Spoke to pts wife, Ms Berrios. She reports pt has a biopsy on 06/12/20 for Pulmonary. And was advised he needs to hold his Brilinta starting on 06/06/20.    While pt is holding his Brilinta already, scheduled pt for his colonoscopy on 06/11/20 and COVID screening on 06/09/20 at [facility].   Prep, date and time given to pt verbally and also sent to pt via patient portal.

## 2020-06-05 NOTE — H&P (VIEW-ONLY)
Pulmonary Outpatient Follow Up Visit     Subjective:       Patient ID: Tanner Ulloa is a 69 y.o. male.    Chief Complaint: Pleural Effusion      HPI     69-year-old male patient presenting for follow-up.      Last visit I started him on long-acting anti muscarinic, shortness of breath coughing are drastically better, referred him to CT surgery Dr. Garcia for evaluation, due to the COVID pandemic the plan was to do radiological follow-up of his abnormalities on his CT scan of the chest.    PET scan was done showed circumferential pleural nodularity and thickening with low avidity.      He was initially evaluated for pleural thickening pleural nodularity on CT chest 2019 February stable in December 2019 however concerning for mesothelioma.     Patient was evaluated as outpatient by a different pulmonology team who does visit the state for evaluation of patient's with history of exposure and at risk for mesothelioma.     He has had 2 CT scans February and December 2019 showing right-sided pleural thickening and pleural nodularity.     PFT done also in 2019 that showed moderate COPD.     Patient complain of shortness of breath on exertion, rare coughing and wheezing.     He does report about 13 lb weight loss over the last year.     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     He was recently evaluated by his cardiologist Dr. Mills he has 2 stents last 1 was done December 2019.     To be noted this patient has had a right-sided pleural effusion in 2016 evaluated at our office no evidence of malignancy            Review of Systems   Constitutional: Positive for weight loss, fatigue and weakness. Negative for fever and chills.   HENT: Negative for nosebleeds.    Eyes: Negative for redness.   Respiratory: Positive for cough, sputum production, shortness of breath, wheezing, dyspnea on extertion and  use of rescue inhaler. Negative for choking.    Cardiovascular: Positive for chest pain.        CAD status post stents   Genitourinary: Negative for hematuria.   Endocrine: Negative for cold intolerance.    Musculoskeletal: Positive for arthralgias.   Skin: Negative for rash.   Gastrointestinal: Negative for vomiting.        Hep C     diarrhea   Neurological: Negative for syncope.   Hematological: Negative for adenopathy. Bleeds easily and excessive bruising.   Psychiatric/Behavioral: Negative for confusion. The patient is nervous/anxious.        Outpatient Encounter Medications as of 6/4/2020   Medication Sig Dispense Refill    albuterol (PROVENTIL/VENTOLIN HFA) 90 mcg/actuation inhaler Inhale 2 puffs into the lungs every 6 (six) hours as needed for Wheezing. Rescue 6.7 g 11    aspirin (ECOTRIN) 81 MG EC tablet       BRILINTA 90 mg tablet       cholestyramine-aspartame (QUESTRAN LIGHT) 4 gram PwPk Take 4 g by mouth.      dicyclomine (BENTYL) 20 mg tablet Take 1 tablet (20 mg total) by mouth 4 (four) times daily before meals and nightly.  tablet 0    esomeprazole (NEXIUM) 20 MG capsule Take 20 mg by mouth every other day.      evolocumab (REPATHA SURECLICK) 140 mg/mL PnIj Inject 1 Syringe into the skin.      FLUoxetine 40 MG capsule Take 40 mg by mouth once daily.      metoprolol succinate (TOPROL-XL) 25 MG 24 hr tablet       nitroGLYCERIN (NITROSTAT) 0.4 MG SL tablet       oxyCODONE (ROXICODONE) 20 mg Tab immediate release tablet       promethazine (PHENERGAN) 12.5 MG Tab Take 1 tablet (12.5 mg total) by mouth every 6 (six) hours as needed. 22 tablet 0    sofosbuvir-velpatasvir (EPCLUSA) 400-100 mg Tab Take 1 tablet by mouth once daily. 28 tablet 2    SUPREP BOWEL PREP KIT 17.5-3.13-1.6 gram SolR Use as directed 354 mL 0    testosterone cypionate (DEPOTESTOTERONE CYPIONATE) 200 mg/mL injection       umeclidinium (INCRUSE ELLIPTA) 62.5 mcg/actuation DsDv Inhale 1 each into the lungs once  "daily. Controller 1 each 5    zolpidem (AMBIEN) 10 mg Tab       [DISCONTINUED] hyoscyamine (LEVSIN/SL) 0.125 mg Subl Place 1 tablet (0.125 mg total) under the tongue every 4 (four) hours as needed. 90 tablet 1     No facility-administered encounter medications on file as of 6/4/2020.        Objective:     Vital Signs (Most Recent)  Vital Signs  Temp: 99.1 °F (37.3 °C)  Pulse: 81  Resp: 18  SpO2: 95 %  BP: 130/76  Height and Weight  Height: 5' 7" (170.2 cm)  Weight: 71.1 kg (156 lb 12 oz)  BSA (Calculated - sq m): 1.83 sq meters  BMI (Calculated): 24.5  Weight in (lb) to have BMI = 25: 159.3]  Wt Readings from Last 2 Encounters:   06/04/20 71.1 kg (156 lb 12 oz)   06/03/20 73.2 kg (161 lb 6 oz)       Physical Exam   Constitutional: He is oriented to person, place, and time. He appears well-developed and well-nourished.   HENT:   Head: Normocephalic.   Neck: Neck supple.   Cardiovascular: Normal rate, regular rhythm and normal heart sounds.   Pulmonary/Chest: Normal expansion and effort normal. No stridor. No respiratory distress. He has decreased breath sounds. He exhibits no tenderness.   Abdominal: Soft. He exhibits no distension.   Musculoskeletal: He exhibits no tenderness.   Lymphadenopathy:     He has no cervical adenopathy.   Neurological: He is alert and oriented to person, place, and time. Gait normal.   Skin: Skin is warm. No cyanosis. Nails show no clubbing.   Upper extremity bruises   Psychiatric: He has a normal mood and affect. His behavior is normal. Judgment and thought content normal.   Nursing note and vitals reviewed.      Laboratory  Lab Results   Component Value Date    WBC 9.83 12/09/2019    RBC 5.86 12/09/2019    HGB 16.8 12/09/2019    HCT 55.2 (H) 12/09/2019    MCV 94 12/09/2019    MCH 28.7 12/09/2019    MCHC 30.4 (L) 12/09/2019    RDW 14.7 (H) 12/09/2019     12/09/2019    MPV 9.6 12/09/2019    GRAN 7.1 12/09/2019    GRAN 71.7 12/09/2019    LYMPH 1.4 12/09/2019    LYMPH 14.2 (L) " 12/09/2019    MONO 0.9 12/09/2019    MONO 9.5 12/09/2019    EOS 0.3 12/09/2019    BASO 0.07 12/09/2019    EOSINOPHIL 3.5 12/09/2019    BASOPHIL 0.7 12/09/2019       BMP  Lab Results   Component Value Date     06/03/2020    K 4.6 06/03/2020     06/03/2020    CO2 26 06/03/2020    BUN 18 06/03/2020    CREATININE 1.4 06/03/2020    CALCIUM 9.7 06/03/2020    ANIONGAP 11 06/03/2020    ESTGFRAFRICA 59.3 (A) 06/03/2020    EGFRNONAA 51.3 (A) 06/03/2020    AST 27 04/07/2020    ALT 33 04/07/2020    PROT 7.7 04/07/2020       No results found for: BNP    No results found for: TSH    No results found for: SEDRATE    No results found for: CRP  No results found for: IGE     No results found for: ASPERGILLUS  No results found for: AFUMIGATUSCL     No results found for: ACE     Diagnostic Results:  I have personally reviewed today the following studies:    PET  scan 3/2020:     Nonspecific subpleural ground-glass opacities noted within the bilateral lung bases demonstrating low level uptake with a SUV max of up to 1.8.  Mild near circumferential pleural thickening with sparing along the mediastinal surface of the right lung base along with a possible trace/small right-sided effusion also demonstrating low level uptake with a SUV max of 2.4.  This is nonspecific.  Although the SUV value is low the possibility of mesothelioma is not excluded.  Any SUV values above 2.0-2.2 within the pleura are suspicious.            Spirometry 2019 moderate COPD.     CT scan February 2019 and December 2019 pleural thickening, right lung volume loss, pleural nodularity concerning for mesothelioma.     Cytology from pleural effusion 2016 right-sided        FINAL PATHOLOGIC DIAGNOSIS  1. Pleural fluid, right (tube):  Negative for malignant cells.  Inflammatory cells, predominantly lymphocytes, and occasional mesothelial cells.  2. Pleural fluid, right (cup):  Negative for malignant cells.  Inflammatory cells, predominantly lymphocytes, and  occasional mesothelial cells.        Assessment/Plan:   Pleural nodules  -     CT Biopsy Lung; Future; Expected date: 06/04/2020    Pleural thickening  -     CT Biopsy Lung; Future; Expected date: 06/04/2020    COPD, moderate    Hx of heart artery stent    Former smoker     continue albuterol p.r.n. continue long-acting anti muscarinic.    Discussed his case with Interventional Radiology Dr Beltre, will proceed CT-GUIDED BIOPSY.    Procedure to be done on Friday 12 2 at 8:00 a.m..  Hold Brilinta coming Saturday with June 6.        Follow up in about 3 months (around 9/4/2020).    This note was prepared using voice recognition system and is likely to have sound alike errors that may have been overlooked even after proof reading.  Please call me with any questions    Discussed diagnosis, its evaluation, treatment and usual course. All questions answered.      Zoe Garland MD

## 2020-06-06 ENCOUNTER — HOSPITAL ENCOUNTER (OUTPATIENT)
Dept: RADIOLOGY | Facility: HOSPITAL | Age: 70
Discharge: HOME | End: 2020-06-06
Attending: PHYSICIAN ASSISTANT
Payer: MEDICARE

## 2020-06-06 DIAGNOSIS — K52.9 CHRONIC DIARRHEA: ICD-10-CM

## 2020-06-06 DIAGNOSIS — R10.84 GENERALIZED ABDOMINAL PAIN: ICD-10-CM

## 2020-06-06 DIAGNOSIS — K58.0 IRRITABLE BOWEL SYNDROME WITH DIARRHEA: ICD-10-CM

## 2020-06-06 PROCEDURE — 25500020 PHARM REV CODE 255: Performed by: PHYSICIAN ASSISTANT

## 2020-06-06 PROCEDURE — 74174 CTA ABD&PLVS W/CONTRAST: CPT | Mod: TC

## 2020-06-06 RX ADMIN — IOHEXOL 100 ML: 350 INJECTION, SOLUTION INTRAVENOUS at 09:06

## 2020-06-08 LAB — TTG IGA SER-ACNC: 4 UNITS

## 2020-06-09 ENCOUNTER — APPOINTMENT (OUTPATIENT)
Dept: URGENT CARE | Facility: CLINIC | Age: 70
End: 2020-06-09
Payer: MEDICARE

## 2020-06-09 DIAGNOSIS — Z86.010 HISTORY OF COLON POLYPS: ICD-10-CM

## 2020-06-09 PROCEDURE — U0003 INFECTIOUS AGENT DETECTION BY NUCLEIC ACID (DNA OR RNA); SEVERE ACUTE RESPIRATORY SYNDROME CORONAVIRUS 2 (SARS-COV-2) (CORONAVIRUS DISEASE [COVID-19]), AMPLIFIED PROBE TECHNIQUE, MAKING USE OF HIGH THROUGHPUT TECHNOLOGIES AS DESCRIBED BY CMS-2020-01-R: HCPCS

## 2020-06-10 LAB — SARS-COV-2 RNA RESP QL NAA+PROBE: NOT DETECTED

## 2020-06-11 ENCOUNTER — ANESTHESIA (OUTPATIENT)
Dept: ENDOSCOPY | Facility: HOSPITAL | Age: 70
End: 2020-06-11
Payer: MEDICARE

## 2020-06-11 ENCOUNTER — HOSPITAL ENCOUNTER (OUTPATIENT)
Facility: HOSPITAL | Age: 70
Discharge: HOME | End: 2020-06-11
Attending: INTERNAL MEDICINE | Admitting: INTERNAL MEDICINE
Payer: MEDICARE

## 2020-06-11 ENCOUNTER — TELEPHONE (OUTPATIENT)
Dept: RADIOLOGY | Facility: HOSPITAL | Age: 70
End: 2020-06-11

## 2020-06-11 ENCOUNTER — ANESTHESIA EVENT (OUTPATIENT)
Dept: ENDOSCOPY | Facility: HOSPITAL | Age: 70
End: 2020-06-11
Payer: MEDICARE

## 2020-06-11 VITALS
TEMPERATURE: 98 F | BODY MASS INDEX: 25.08 KG/M2 | DIASTOLIC BLOOD PRESSURE: 74 MMHG | RESPIRATION RATE: 18 BRPM | HEIGHT: 67 IN | OXYGEN SATURATION: 96 % | WEIGHT: 159.81 LBS | SYSTOLIC BLOOD PRESSURE: 119 MMHG | HEART RATE: 69 BPM

## 2020-06-11 DIAGNOSIS — Z77.090 ASBESTOS EXPOSURE: ICD-10-CM

## 2020-06-11 DIAGNOSIS — K58.0 IRRITABLE BOWEL SYNDROME WITH DIARRHEA: Primary | ICD-10-CM

## 2020-06-11 DIAGNOSIS — J92.9 PLEURAL THICKENING: ICD-10-CM

## 2020-06-11 DIAGNOSIS — B18.2 CHRONIC HEPATITIS C WITHOUT HEPATIC COMA: ICD-10-CM

## 2020-06-11 DIAGNOSIS — K63.5 COLON POLYP: Primary | ICD-10-CM

## 2020-06-11 DIAGNOSIS — R91.8 OTHER NONSPECIFIC ABNORMAL FINDING OF LUNG FIELD: ICD-10-CM

## 2020-06-11 DIAGNOSIS — R22.2 PLEURAL NODULES: ICD-10-CM

## 2020-06-11 PROCEDURE — 88305 TISSUE EXAM BY PATHOLOGIST: CPT | Mod: 26,,, | Performed by: PATHOLOGY

## 2020-06-11 PROCEDURE — 88305 TISSUE EXAM BY PATHOLOGIST: ICD-10-PCS | Mod: 26,,, | Performed by: PATHOLOGY

## 2020-06-11 PROCEDURE — 25000003 PHARM REV CODE 250: Performed by: NURSE ANESTHETIST, CERTIFIED REGISTERED

## 2020-06-11 PROCEDURE — 27201012 HC FORCEPS, HOT/COLD, DISP: Performed by: INTERNAL MEDICINE

## 2020-06-11 PROCEDURE — 37000008 HC ANESTHESIA 1ST 15 MINUTES: Performed by: INTERNAL MEDICINE

## 2020-06-11 PROCEDURE — 88305 TISSUE EXAM BY PATHOLOGIST: CPT | Mod: 59 | Performed by: PATHOLOGY

## 2020-06-11 PROCEDURE — 37000009 HC ANESTHESIA EA ADD 15 MINS: Performed by: INTERNAL MEDICINE

## 2020-06-11 PROCEDURE — 63600175 PHARM REV CODE 636 W HCPCS: Performed by: NURSE ANESTHETIST, CERTIFIED REGISTERED

## 2020-06-11 PROCEDURE — 45380 COLONOSCOPY AND BIOPSY: CPT | Mod: PT,,, | Performed by: INTERNAL MEDICINE

## 2020-06-11 PROCEDURE — 45380 PR COLONOSCOPY,BIOPSY: ICD-10-PCS | Mod: PT,,, | Performed by: INTERNAL MEDICINE

## 2020-06-11 PROCEDURE — 45380 COLONOSCOPY AND BIOPSY: CPT | Performed by: INTERNAL MEDICINE

## 2020-06-11 RX ORDER — SODIUM CHLORIDE, SODIUM LACTATE, POTASSIUM CHLORIDE, CALCIUM CHLORIDE 600; 310; 30; 20 MG/100ML; MG/100ML; MG/100ML; MG/100ML
INJECTION, SOLUTION INTRAVENOUS CONTINUOUS PRN
Status: DISCONTINUED | OUTPATIENT
Start: 2020-06-11 | End: 2020-06-11

## 2020-06-11 RX ORDER — SODIUM CHLORIDE, SODIUM LACTATE, POTASSIUM CHLORIDE, CALCIUM CHLORIDE 600; 310; 30; 20 MG/100ML; MG/100ML; MG/100ML; MG/100ML
INJECTION, SOLUTION INTRAVENOUS CONTINUOUS
Status: DISCONTINUED | OUTPATIENT
Start: 2020-06-11 | End: 2020-06-11 | Stop reason: HOSPADM

## 2020-06-11 RX ORDER — PROPOFOL 10 MG/ML
VIAL (ML) INTRAVENOUS
Status: DISCONTINUED | OUTPATIENT
Start: 2020-06-11 | End: 2020-06-11

## 2020-06-11 RX ORDER — SODIUM CHLORIDE 0.9 % (FLUSH) 0.9 %
10 SYRINGE (ML) INJECTION
Status: DISCONTINUED | OUTPATIENT
Start: 2020-06-11 | End: 2020-06-11 | Stop reason: HOSPADM

## 2020-06-11 RX ORDER — LIDOCAINE HYDROCHLORIDE 10 MG/ML
INJECTION, SOLUTION EPIDURAL; INFILTRATION; INTRACAUDAL; PERINEURAL
Status: DISCONTINUED | OUTPATIENT
Start: 2020-06-11 | End: 2020-06-11

## 2020-06-11 RX ADMIN — PROPOFOL 50 MG: 10 INJECTION, EMULSION INTRAVENOUS at 09:06

## 2020-06-11 RX ADMIN — SODIUM CHLORIDE, SODIUM LACTATE, POTASSIUM CHLORIDE, AND CALCIUM CHLORIDE: .6; .31; .03; .02 INJECTION, SOLUTION INTRAVENOUS at 09:06

## 2020-06-11 RX ADMIN — PROPOFOL 100 MG: 10 INJECTION, EMULSION INTRAVENOUS at 09:06

## 2020-06-11 RX ADMIN — LIDOCAINE HYDROCHLORIDE 50 MG: 10 INJECTION, SOLUTION EPIDURAL; INFILTRATION; INTRACAUDAL; PERINEURAL at 09:06

## 2020-06-11 NOTE — DISCHARGE SUMMARY
Endoscopy Discharge Summary      Admit Date: 6/11/2020    Discharge Date and Time:  6/11/2020 9:51 AM    Attending Physician: Jessi Meyers MD     Discharge Physician: Jessi Meyers MD     Principal Admitting Diagnoses: Colon polyp         Discharge Diagnosis: The encounter diagnosis was Colon polyp.     Discharged Condition: Good    Indication for Admission: Colon polyp     Hospital Course: Patient was admitted for an inpatient procedure and tolerated the procedure well with no complications.    Significant Diagnostic Studies: Colonoscopy with polypectomy    Estimated Blood Loss: 1 ml.    Discussed with: patient.    Disposition: Home.    Follow Up/Patient Instructions:   Current Discharge Medication List      CONTINUE these medications which have NOT CHANGED    Details   albuterol (PROVENTIL/VENTOLIN HFA) 90 mcg/actuation inhaler Inhale 2 puffs into the lungs every 6 (six) hours as needed for Wheezing. Rescue  Qty: 6.7 g, Refills: 11    Associated Diagnoses: COPD, moderate      aspirin (ECOTRIN) 81 MG EC tablet       cholestyramine-aspartame (QUESTRAN LIGHT) 4 gram PwPk Take 4 g by mouth.      dicyclomine (BENTYL) 20 mg tablet Take 1 tablet (20 mg total) by mouth 4 (four) times daily before meals and nightly. PRN  Qty: 120 tablet, Refills: 0      esomeprazole (NEXIUM) 20 MG capsule Take 20 mg by mouth every other day.      FLUoxetine 40 MG capsule Take 40 mg by mouth once daily.      metoprolol succinate (TOPROL-XL) 25 MG 24 hr tablet       oxyCODONE (ROXICODONE) 20 mg Tab immediate release tablet     Comments: Quantity prescribed more than 7 day supply? Press F2 and select one:[id number]        promethazine (PHENERGAN) 12.5 MG Tab Take 1 tablet (12.5 mg total) by mouth every 6 (six) hours as needed.  Qty: 22 tablet, Refills: 0      zolpidem (AMBIEN) 10 mg Tab       BRILINTA 90 mg tablet       evolocumab (REPATHA SURECLICK) 140 mg/mL PnIj Inject 1 Syringe into the skin.      nitroGLYCERIN (NITROSTAT) 0.4 MG  SL tablet       sofosbuvir-velpatasvir (EPCLUSA) 400-100 mg Tab Take 1 tablet by mouth once daily.  Qty: 28 tablet, Refills: 2    Associated Diagnoses: Chronic hepatitis C without hepatic coma; Hepatitis B core antibody positive      testosterone cypionate (DEPOTESTOTERONE CYPIONATE) 200 mg/mL injection       umeclidinium (INCRUSE ELLIPTA) 62.5 mcg/actuation DsDv Inhale 1 each into the lungs once daily. Controller  Qty: 1 each, Refills: 5    Associated Diagnoses: COPD, moderate         STOP taking these medications       SUPREP BOWEL PREP KIT 17.5-3.13-1.6 gram SolR Comments:   Reason for Stopping:               No discharge procedures on file.        Jessi Meyers MD  Gastroenterology and Hepatology

## 2020-06-11 NOTE — ANESTHESIA PREPROCEDURE EVALUATION
06/11/2020  Tanner Ulloa is a 69 y.o., male.    Anesthesia Evaluation    I have reviewed the Patient Summary Reports.    I have reviewed the Nursing Notes. I have reviewed the NPO Status.   I have reviewed the Medications.     Review of Systems  Cardiovascular:   Hypertension, well controlled Past MI CAD      Hepatic/GI:   GERD Liver Disease, Hepatitis, C        Physical Exam  General:  Well nourished    Airway/Jaw/Neck:  Airway Findings: Mouth Opening: Normal Tongue: Normal  General Airway Assessment: Adult  Mallampati: II  Improves to II with phonation.  TM Distance: Normal, at least 6 cm  Jaw/Neck Findings:  Neck ROM: Normal ROM            Mental Status:  Mental Status Findings:  Cooperative, Alert and Oriented         Anesthesia Plan  Type of Anesthesia, risks & benefits discussed:  Anesthesia Type:  MAC  Patient's Preference:   Intra-op Monitoring Plan:   Intra-op Monitoring Plan Comments:   Post Op Pain Control Plan: multimodal analgesia  Post Op Pain Control Plan Comments:   Induction:    Beta Blocker:  Patient is on a Beta-Blocker and has received one dose within the past 24 hours (No further documentation required).       Informed Consent:  Anesthesia consent signed with patient.  ASA Score: 2     Day of Surgery Review of History & Physical: I have interviewed and examined the patient. I have reviewed the patient's H&P dated:            Ready For Surgery From Anesthesia Perspective.

## 2020-06-11 NOTE — TRANSFER OF CARE
"Anesthesia Transfer of Care Note    Patient: Tanner Ulloa    Procedure(s) Performed: Procedure(s) (LRB):  COLONOSCOPY (N/A)    Patient location: PACU    Anesthesia Type: MAC    Transport from OR: Transported from OR on room air with adequate spontaneous ventilation    Post pain: adequate analgesia    Post assessment: no apparent anesthetic complications and tolerated procedure well    Post vital signs: stable    Level of consciousness: awake, alert and oriented    Nausea/Vomiting: no nausea/vomiting    Complications: none    Transfer of care protocol was followed      Last vitals:   Visit Vitals  /74 (BP Location: Left arm, Patient Position: Lying)   Pulse 78   Temp 36.6 °C (97.9 °F) (Temporal)   Resp 19   Ht 5' 7" (1.702 m)   Wt 72.5 kg (159 lb 13.3 oz)   SpO2 98%   BMI 25.03 kg/m²     "

## 2020-06-11 NOTE — ANESTHESIA POSTPROCEDURE EVALUATION
Anesthesia Post Evaluation    Patient: Tanner Ulloa    Procedure(s) Performed: Procedure(s) (LRB):  COLONOSCOPY (N/A)    Final Anesthesia Type: MAC    Patient location during evaluation: GI PACU  Patient participation: Yes- Able to Participate  Level of consciousness: awake and alert and oriented  Post-procedure vital signs: reviewed and stable  Pain management: adequate  Airway patency: patent    PONV status at discharge: No PONV  Anesthetic complications: no      Cardiovascular status: hemodynamically stable and blood pressure returned to baseline  Respiratory status: room air, unassisted and spontaneous ventilation  Hydration status: euvolemic  Follow-up not needed.          Vitals Value Taken Time   BP 96/70 6/11/2020  9:52 AM   Temp 36.6 °C (97.9 °F) 6/11/2020  9:52 AM   Pulse 71 6/11/2020  9:52 AM   Resp 18 6/11/2020  9:52 AM   SpO2 98 % 6/11/2020  9:52 AM         No case tracking events are documented in the log.      Pain/Joyce Score: No data recorded

## 2020-06-11 NOTE — H&P
Short Stay Endoscopy History and Physical    PCP - Elizabet Wasserman MD    Procedure - Colonoscopy  ASA - 2  Mallampati - per anesthesia  History of Anesthesia problems - no  Family history Anesthesia problems -  no     HPI:  This is a 69 y.o. male here for evaluation of :   Active Hospital Problems    Diagnosis  POA    *Colon polyp [K63.5]  Yes      Resolved Hospital Problems   No resolved problems to display.         Health Maintenance       Date Due Completion Date    TETANUS VACCINE 08/24/1969 ---    High Dose Statin 08/24/1972 ---    Shingles Vaccine (2 of 3) 12/16/2013 10/21/2013    Pneumococcal Vaccine (65+ Low/Medium Risk) (1 of 2 - PCV13) 08/24/2016 9/26/2013    LDCT Lung Screen 01/06/2017 1/6/2016    Colonoscopy 03/09/2020 3/9/2015    Aspirin/Antiplatelet Therapy 06/11/2021 6/11/2020    Lipid Panel 12/20/2021 12/20/2016          Screening - no  History of polyps - yes  Diarrhea - no  Anemia - no  Blood in stools - no  Abdominal pain - no  Family History of Colon Cancer- no  Other - no    ROS:  CONSTITUTIONAL: Denies weight change,  fatigue, fevers, chills, night sweats.  CARDIOVASCULAR: Denies chest pain, shortness of breath, orthopnea and edema.  RESPIRATORY: Denies cough, hemoptysis, dyspnea, and wheezing.  GI: See HPI.    Medical History:   Past Medical History:   Diagnosis Date    Anticoagulant long-term use     Cholelithiasis with choledocholithiasis 2013    tx's with cholecystectomy and ERCP    Chronic back pain     GERD (gastroesophageal reflux disease)     Heart attack     Hepatitis C     History of Clostridium difficile infection     Hypertension     Irritable bowel syndrome     Testosterone deficiency        Surgical History:   Past Surgical History:   Procedure Laterality Date    BACK SURGERY  04/2004    CHOLECYSTECTOMY  04/2013    COLONOSCOPY  2015    CORONARY STENT PLACEMENT      ERCP  2013    stent plaecement; then stone removal and stent replacement; then stent removal    KNEE  ARTHRODESIS Right     UPPER GASTROINTESTINAL ENDOSCOPY         Family History:   Family History   Problem Relation Age of Onset    Diverticulitis Father         required bowel resection    Colon cancer Neg Hx        Social History:   Social History     Tobacco Use    Smoking status: Former Smoker     Packs/day: 2.00     Years: 40.00     Pack years: 80.00     Types: Cigarettes     Last attempt to quit: 2015     Years since quittin.2    Smokeless tobacco: Never Used   Substance Use Topics    Alcohol use: No    Drug use: No       Allergies:   Review of patient's allergies indicates:   Allergen Reactions    Dilaudid [hydromorphone (bulk)] Other (See Comments)     Severe headache    Hydromorphone     Statins-hmg-coa reductase inhibitors      Cramps is the reaction    Stadol [butorphanol tartrate] Palpitations    Sulfa (sulfonamide antibiotics) Rash    Talwin [pentazocine lactate] Palpitations       Medications:   No current facility-administered medications on file prior to encounter.      Current Outpatient Medications on File Prior to Encounter   Medication Sig Dispense Refill    albuterol (PROVENTIL/VENTOLIN HFA) 90 mcg/actuation inhaler Inhale 2 puffs into the lungs every 6 (six) hours as needed for Wheezing. Rescue 6.7 g 11    aspirin (ECOTRIN) 81 MG EC tablet       dicyclomine (BENTYL) 20 mg tablet Take 1 tablet (20 mg total) by mouth 4 (four) times daily before meals and nightly.  tablet 0    esomeprazole (NEXIUM) 20 MG capsule Take 20 mg by mouth every other day.      FLUoxetine 40 MG capsule Take 40 mg by mouth once daily.      metoprolol succinate (TOPROL-XL) 25 MG 24 hr tablet       oxyCODONE (ROXICODONE) 20 mg Tab immediate release tablet       promethazine (PHENERGAN) 12.5 MG Tab Take 1 tablet (12.5 mg total) by mouth every 6 (six) hours as needed. 22 tablet 0    zolpidem (AMBIEN) 10 mg Tab       BRILINTA 90 mg tablet       evolocumab (REPATHA SURECLICK) 140 mg/mL PnIj  Inject 1 Syringe into the skin.      nitroGLYCERIN (NITROSTAT) 0.4 MG SL tablet       sofosbuvir-velpatasvir (EPCLUSA) 400-100 mg Tab Take 1 tablet by mouth once daily. 28 tablet 2    SUPREP BOWEL PREP KIT 17.5-3.13-1.6 gram SolR Use as directed 354 mL 0    testosterone cypionate (DEPOTESTOTERONE CYPIONATE) 200 mg/mL injection       umeclidinium (INCRUSE ELLIPTA) 62.5 mcg/actuation DsDv Inhale 1 each into the lungs once daily. Controller 1 each 5    [DISCONTINUED] hyoscyamine (LEVSIN/SL) 0.125 mg Subl Place 1 tablet (0.125 mg total) under the tongue every 4 (four) hours as needed. 90 tablet 1       Physical Exam:  Vital Signs:   Vitals:    06/11/20 0908   BP: 106/74   Pulse: 78   Resp: 19   Temp: 97.9 °F (36.6 °C)     General Appearance: Well appearing in no acute distress  ENT: OP clear  Chest: CTA B  CV: RRR, no m/r/g  Abd: s/nt/nd/nabs  Ext: no edema    Labs:Reviewed    IMP:  Active Hospital Problems    Diagnosis  POA    *Colon polyp [K63.5]  Yes      Resolved Hospital Problems   No resolved problems to display.         Plan:   I have explained the risks and benefits of colonoscopy to the patient including but not limited to bleeding, perforation, infection, and death. The patient wishes to proceed.

## 2020-06-11 NOTE — TELEPHONE ENCOUNTER
Called patient and left voicemail - patient needs to be here at 0730 for procedure.  Nothing to eat/drink after midnight tonight, must have a ride d/t sedation.  Patient will register first, then get lab drawn, then check in at Radiology desk.  I left our phone number incase she has any questions.

## 2020-06-11 NOTE — [employer] PATIENT INSTRUCTIONS
Discharge Summary/Instructions after an Endoscopic Procedure  Patient Name: Tanner Ulloa  Patient MRN: [id number]  Patient YOB: 1950 Thursday, June 11, 2020 Jessi Meyers MD  RESTRICTIONS:  During your procedure today, you received medications for sedation.  These   medications may affect your judgment, balance and coordination.  Therefore,   for 24 hours, you have the following restrictions:   - DO NOT drive a car, operate machinery, make legal/financial decisions,   sign important papers or drink alcohol.    ACTIVITY:  Today: no heavy lifting, straining or running due to procedural   sedation/anesthesia.  The following day: return to full activity including work.  DIET:  Eat and drink normally unless instructed otherwise.     TREATMENT FOR COMMON SIDE EFFECTS:  - Mild abdominal pain, nausea, belching, bloating or excessive gas:  rest,   eat lightly and use a heating pad.  - Sore Throat: treat with throat lozenges and/or gargle with warm salt   water.  - Because air was used during the procedure, expelling large amounts of air   from your rectum or belching is normal.  - If a bowel prep was taken, you may not have a bowel movement for 1-3 days.    This is normal.  SYMPTOMS TO WATCH FOR AND REPORT TO YOUR PHYSICIAN:  1. Abdominal pain or bloating, other than gas cramps.  2. Chest pain.  3. Back pain.  4. Signs of infection such as: chills or fever occurring within 24 hours   after the procedure.  5. Rectal bleeding, which would show as bright red, maroon, or black stools.   (A tablespoon of blood from the rectum is not serious, especially if   hemorrhoids are present.)  6. Vomiting.  7. Weakness or dizziness.  GO DIRECTLY TO THE NEAREST EMERGENCY ROOM IF YOU HAVE ANY OF THE FOLLOWING:      Difficulty breathing              Chills and/or fever over 101 F   Persistent vomiting and/or vomiting blood   Severe abdominal pain   Severe chest pain   Black, tarry stools   Bleeding- more than one  tablespoon   Any other symptom or condition that you feel may need urgent attention  Your doctor recommends these additional instructions:  If any biopsies were taken, your doctors clinic will contact you in 1 to 2   weeks with any results.  - Discharge patient to home (via wheelchair).   - Resume previous diet.   - Continue present medications.   - Await pathology results.   - Repeat colonoscopy in 3 years for surveillance.   - Telephone GI clinic for pathology results in 2 weeks.   - Patient has a contact number available for emergencies.  The signs and   symptoms of potential delayed complications were discussed with the   patient.  Return to normal activities tomorrow.  Written discharge   instructions were provided to the patient.  For questions, problems or results please call your physician Jessi Meyers MD at Work:  [phone]  If you have any questions about the above instructions, call the GI   department at [phone] or call the endoscopy unit at [phone]   from 7am until 3 pm.  [facility] [place], EMERGENCY ROOM PHONE NUMBER:   [phone]  IF A COMPLICATION OR EMERGENCY SITUATION ARISES AND YOU ARE UNABLE TO REACH   YOUR PHYSICIAN - GO DIRECTLY TO THE EMERGENCY ROOM.  I have read or have had read to me these discharge instructions for my   procedure and have received a written copy.  I understand these   instructions and will follow-up with my physician if I have any questions.     __________________________________       _____________________________________  Nurse Signature                                          Patient/Designated   Responsible Party Signature  MD Jessi Deutsch MD  6/11/2020 9:48:22 AM  [employer]

## 2020-06-11 NOTE — DISCHARGE INSTRUCTIONS

## 2020-06-12 ENCOUNTER — HOSPITAL ENCOUNTER (OUTPATIENT)
Dept: RADIOLOGY | Facility: HOSPITAL | Age: 70
Discharge: HOME | End: 2020-06-12
Attending: PHYSICIAN ASSISTANT
Payer: MEDICARE

## 2020-06-12 ENCOUNTER — HOSPITAL ENCOUNTER (OUTPATIENT)
Dept: RADIOLOGY | Facility: HOSPITAL | Age: 70
Discharge: HOME | End: 2020-06-12
Attending: INTERNAL MEDICINE
Payer: MEDICARE

## 2020-06-12 ENCOUNTER — PATIENT MESSAGE (OUTPATIENT)
Dept: GASTROENTEROLOGY | Facility: CLINIC | Age: 70
End: 2020-06-12

## 2020-06-12 VITALS
DIASTOLIC BLOOD PRESSURE: 80 MMHG | SYSTOLIC BLOOD PRESSURE: 162 MMHG | WEIGHT: 159 LBS | RESPIRATION RATE: 16 BRPM | HEIGHT: 67 IN | HEART RATE: 62 BPM | OXYGEN SATURATION: 100 % | BODY MASS INDEX: 24.96 KG/M2

## 2020-06-12 DIAGNOSIS — R22.2 PLEURAL NODULES: ICD-10-CM

## 2020-06-12 DIAGNOSIS — J92.9 PLEURAL THICKENING: ICD-10-CM

## 2020-06-12 PROCEDURE — 77012 CT SCAN FOR NEEDLE BIOPSY: CPT | Mod: TC

## 2020-06-12 PROCEDURE — 63600175 PHARM REV CODE 636 W HCPCS: Performed by: RADIOLOGY

## 2020-06-12 PROCEDURE — 88305 TISSUE EXAM BY PATHOLOGIST: ICD-10-PCS | Mod: 26,,, | Performed by: PATHOLOGY

## 2020-06-12 PROCEDURE — 71045 X-RAY EXAM CHEST 1 VIEW: CPT | Mod: TC

## 2020-06-12 PROCEDURE — 88305 TISSUE EXAM BY PATHOLOGIST: CPT | Performed by: PATHOLOGY

## 2020-06-12 PROCEDURE — 88305 TISSUE EXAM BY PATHOLOGIST: CPT | Mod: 26,,, | Performed by: PATHOLOGY

## 2020-06-12 RX ORDER — MIDAZOLAM HYDROCHLORIDE 1 MG/ML
INJECTION INTRAMUSCULAR; INTRAVENOUS CODE/TRAUMA/SEDATION MEDICATION
Status: COMPLETED | OUTPATIENT
Start: 2020-06-12 | End: 2020-06-12

## 2020-06-12 RX ORDER — FENTANYL CITRATE 50 UG/ML
INJECTION, SOLUTION INTRAMUSCULAR; INTRAVENOUS CODE/TRAUMA/SEDATION MEDICATION
Status: COMPLETED | OUTPATIENT
Start: 2020-06-12 | End: 2020-06-12

## 2020-06-12 RX ADMIN — FENTANYL CITRATE 50 MCG: 50 INJECTION, SOLUTION INTRAMUSCULAR; INTRAVENOUS at 09:06

## 2020-06-12 RX ADMIN — MIDAZOLAM HYDROCHLORIDE 1 MG: 1 INJECTION, SOLUTION INTRAMUSCULAR; INTRAVENOUS at 08:06

## 2020-06-12 RX ADMIN — MIDAZOLAM HYDROCHLORIDE 1 MG: 1 INJECTION, SOLUTION INTRAMUSCULAR; INTRAVENOUS at 09:06

## 2020-06-12 RX ADMIN — FENTANYL CITRATE 50 MCG: 50 INJECTION, SOLUTION INTRAMUSCULAR; INTRAVENOUS at 08:06

## 2020-06-12 NOTE — PLAN OF CARE
2nd CXR read by Dr. Harris and patient is OK to DC.  Patient has no c/o pain or SOB. Discussed DC instructions with patient and wife, answered all questions.  Band aid to procedure site - C/D/I.  Patient dc'd via WC to front of hospital and home with wife.

## 2020-06-12 NOTE — DISCHARGE INSTRUCTIONS
CT-Guided Lung Biopsy  CT-guided lung biopsy is a procedure to collect small tissue samples from an abnormal area in your lung. During the procedure, an imaging method called CT (computed tomography) is used to show live pictures of your lung. Then a thin needle is used to remove the tissue samples. The samples are tested in a lab for cancer and other problems.     For the biopsy, a thin needle is used to remove samples of tissue from an abnormal area in the lung.   Getting ready for your procedure  Follow any instructions from your healthcare provider.  Tell your provider about any medicines you are taking. It is important for your provider to know if you are taking any blood-thinning medicines or have a bleeding disorder.  You may need to stop taking all or some medicine before the procedure. This includes:  · All prescription medicines  · Blood-thinning medicines (anticoagulants)  · Over-the-counter medicines such as aspirin or ibuprofen  · Street drugs  · Herbs, vitamins, and other supplements  Also tell your provider if you:  · Are pregnant or think you may be pregnant  · Are breastfeeding  · Are allergic to or have intolerances to any medicines  · Have a chronic cough, new cough, or other illness  · Use oxygen therapy at home  · Smoke or drink alcohol on a regular basis  Follow any directions youre given for not eating or drinking before the procedure.  The day of your procedure  The procedure takes about 60 minutes. The entire procedure (including time to prepare and recover) takes several hours. Youll likely go home the same day.  Before the procedure begins:  · An IV (intravenous) line may be put into a vein in your hand or arm. This line supplies fluids and medicines.  · To keep you free of pain during the procedure, you may be given anesthesia. Depending on the type of anesthesia used, you may be awake, drowsy, or in a deep sleep for the procedure.  During the procedure:  · Youll lie on a CT scan  table. You may be on your back, side, or stomach. Pictures of your lung are then taken using the CT scanner. This helps your provider find the best place to position the needle in your lungs.  · A aparna is made on your skin where the needle will be inserted (biopsy site). The site is injected with numbing medicine.  · Using the CT pictures as a guide, the needle is passed through the numbed skin between your ribs and into your lung. Samples of tissue are then removed from the abnormal area in your lung. The samples are sent to a lab to be checked for problems.  · When the procedure is complete, the needle is removed. Pressure is applied to the biopsy site to help stop any bleeding. The site is then bandaged.  After the procedure:  · Youll be taken to a room to rest until the anesthesia wears off.  · A chest X-ray may be done. This is to make sure there was no damage to your lungs or the area where the needle was placed.  · When its time for you to go home, have an adult family member or friend ready to drive you.  Recovering at home  You may cough up a small amount of blood shortly after the procedure. Later, you may also have some soreness around the biopsy site. Once at home, follow any instructions youre given. Be sure to:  · Take all medicines as directed.  · Care for the biopsy site as instructed.  · Check for signs of infection at the biopsy site (see below).  · Do not bathe or shower until your provider says it's OK. If you wish, you may wash with a sponge or washcloth.  · Avoid heavy lifting and other strenuous activities as directed.  · If you plan any air travel, ask your provider when you can do so. You may be told not to fly for a few weeks. This is because pressure changes may affect your lungs.  When to call your provider  Call 911 or your local emergency number if you have sudden, severe difficulty breathing. This could be a life-threatening emergency.  Call your healthcare provider for less severe  symptoms that are still concerning. If you are unable to speak with your provider, go to the emergency room if you have any of the following symptoms:  · Fever of 100.4°F (38°C) or higher, or as directed by your provider  · Sudden chest pain, shortness of breath, or fainting  · Coughing up increasing amounts of blood  · Signs of infection at the biopsy site, such as increased redness or swelling, warmth, more pain, bleeding, or bad-smelling drainage   Follow-up  The provider who ordered your test will discuss the biopsy results with you during a follow-up visit. Results are usually ready within 1 to 2 weeks. If more tests or treatments are needed, your provider will discuss these with you.  Risks and possible complications  All procedures have some risk. Possible risks of this procedure include:  · An air leak in your lung (pneumothorax). This may require a stay in the hospital and treatment to re-inflate the lung.  · Bleeding into or around the lung  · Infection in the skin or lung  · Injury to other structures in the chest  · Risks of anesthesia. This will be discussed with you before the procedure.   Date Last Reviewed: 6/11/2015  © 4536-4499 [employer]. [address] [place] [address]. All rights reserved. This information is not intended as a substitute for professional medical care. Always follow your healthcare professional's instructions.        Recovery After Procedural Sedation (Adult)   You have been given medicine by vein to make you sleep during your surgery. This may have included both a pain medicine and sleeping medicine. Most of the effects have worn off. But you may still have some drowsiness for the next 6 to 8 hours.  Home care  Follow these guidelines when you get home:  · For the next 8 hours, you should be watched by a responsible adult. This person should make sure your condition is not getting worse.  · Don't drink any alcohol for the next 24 hours.  · Don't drive,  operate dangerous machinery, or make important business or personal decisions during the next 24 hours.  · To prevent injury or falls, use caution when standing and walking for at least 24 hours after your procedure.  Note: Your healthcare provider may tell you not to take any medicine by mouth for pain or sleep in the next 4 hours. These medicines may react with the medicines you were given in the hospital. This could cause a much stronger response than usual.  Follow-up care  Follow up with your healthcare provider if you are not alert and back to your usual level of activity within 12 hours.  When to seek medical advice  Call your healthcare provider right away if any of these occur:  · Drowsiness gets worse  · Weakness or dizziness gets worse  · Repeated vomiting  · You can't be awakened  · Fever  · New rash  Malia last reviewed this educational content on 9/1/2019  © 1161-1550 [employer]. [address] [place] [address], [place] [address]. All rights reserved. This information is not intended as a substitute for professional medical care. Always follow your healthcare professional's instructions.

## 2020-06-12 NOTE — SEDATION DOCUMENTATION
Patient placed on CT table supine with bilateral arms above head.  CM in place.  VSS.  Patient verbalizes understanding of procedure

## 2020-06-15 ENCOUNTER — TELEPHONE (OUTPATIENT)
Dept: PULMONOLOGY | Facility: CLINIC | Age: 70
End: 2020-06-15

## 2020-06-15 LAB
FINAL PATHOLOGIC DIAGNOSIS: NORMAL
FINAL PATHOLOGIC DIAGNOSIS: NORMAL
GROSS: NORMAL
GROSS: NORMAL

## 2020-06-17 NOTE — TELEPHONE ENCOUNTER
I spoke with the patient's wife via phone. She says his leg is 75% better. There is no pain. He is wearing a knee brace for stability. I advised they contact his Pain Management provider to see if they need to see him or if they have any recommendations. As for his prior CT findings, his wife said they messaged Dr. Mills, his Cardiologist, and she reviewed the images and said these were stable findings. He is scheduled to see Dr. Mills July 8.   Gerardo

## 2020-06-24 ENCOUNTER — NURSE TRIAGE (OUTPATIENT)
Dept: ADMINISTRATIVE | Facility: CLINIC | Age: 70
End: 2020-06-24

## 2020-06-24 NOTE — TELEPHONE ENCOUNTER
Patient contacted on behalf of the Post Procedural Symptom Tracking Program. No answer. Second attempt to contact patient will occur tomorrow per post procedural protocol.       Reason for Disposition   No answer.  First attempt to contact caller.  Follow-up call scheduled within 15 minutes.    Additional Information   Negative: Caller is angry or rude (e.g., hangs up, verbally abusive, yelling)   Negative: Caller hangs up   Negative: Caller has already spoken with the PCP and has no further questions.   Negative: Caller has already spoken with another triager and has no further questions.   Negative: Caller has already spoken with another triager or PCP AND has further questions AND triager able to answer questions.   Negative: Busy signal.  First attempt to contact caller.  Follow-up call scheduled within 15 minutes.    Protocols used: NO CONTACT OR DUPLICATE CONTACT CALL-A-

## 2020-06-25 NOTE — TELEPHONE ENCOUNTER
Pt contacted through the Post Procedural Symptom Tracker. No answer. No additional contact today as per post procedure protocol.    Reason for Disposition   Second attempt to contact family AND no contact made.  Phone number verified.    Protocols used: NO CONTACT OR DUPLICATE CONTACT CALL-A-AH

## 2020-07-13 ENCOUNTER — TELEPHONE (OUTPATIENT)
Dept: PULMONOLOGY | Facility: CLINIC | Age: 70
End: 2020-07-13

## 2020-07-24 ENCOUNTER — TELEPHONE (OUTPATIENT)
Dept: PHARMACY | Facility: CLINIC | Age: 70
End: 2020-07-24

## 2020-07-24 NOTE — TELEPHONE ENCOUNTER
LVM to see if patient has resumed Epclusa treatment. 2nd attempt: staff msg to provider for updates on HCV treatment with Epclusa. EMR notes last MyChart msg to pt was 7/21/20 to see if he is still on Epclusa. TTN

## 2020-08-17 ENCOUNTER — OFFICE VISIT (OUTPATIENT)
Dept: PULMONOLOGY | Facility: CLINIC | Age: 70
End: 2020-08-17
Payer: MEDICARE

## 2020-08-17 VITALS
TEMPERATURE: 98 F | HEART RATE: 73 BPM | DIASTOLIC BLOOD PRESSURE: 66 MMHG | HEIGHT: 67 IN | RESPIRATION RATE: 16 BRPM | SYSTOLIC BLOOD PRESSURE: 102 MMHG | OXYGEN SATURATION: 95 % | BODY MASS INDEX: 25.58 KG/M2 | WEIGHT: 163 LBS

## 2020-08-17 DIAGNOSIS — J92.9 PLEURAL THICKENING: Primary | ICD-10-CM

## 2020-08-17 DIAGNOSIS — R49.0 HOARSENESS: ICD-10-CM

## 2020-08-17 DIAGNOSIS — R22.2 PLEURAL NODULES: ICD-10-CM

## 2020-08-17 DIAGNOSIS — J44.1 COPD WITH ACUTE EXACERBATION: ICD-10-CM

## 2020-08-17 DIAGNOSIS — J44.9 COPD, MODERATE: ICD-10-CM

## 2020-08-17 PROCEDURE — 99999 PR PBB SHADOW E&M-EST. PATIENT-LVL V: ICD-10-PCS | Mod: PBBFAC,,, | Performed by: INTERNAL MEDICINE

## 2020-08-17 PROCEDURE — 3008F PR BODY MASS INDEX (BMI) DOCUMENTED: ICD-10-PCS | Mod: CPTII,S$GLB,, | Performed by: INTERNAL MEDICINE

## 2020-08-17 PROCEDURE — 1101F PT FALLS ASSESS-DOCD LE1/YR: CPT | Mod: CPTII,S$GLB,, | Performed by: INTERNAL MEDICINE

## 2020-08-17 PROCEDURE — 99215 OFFICE O/P EST HI 40 MIN: CPT | Mod: S$GLB,,, | Performed by: INTERNAL MEDICINE

## 2020-08-17 PROCEDURE — 1159F PR MEDICATION LIST DOCUMENTED IN MEDICAL RECORD: ICD-10-PCS | Mod: S$GLB,,, | Performed by: INTERNAL MEDICINE

## 2020-08-17 PROCEDURE — 99215 PR OFFICE/OUTPT VISIT, EST, LEVL V, 40-54 MIN: ICD-10-PCS | Mod: S$GLB,,, | Performed by: INTERNAL MEDICINE

## 2020-08-17 PROCEDURE — 1101F PR PT FALLS ASSESS DOC 0-1 FALLS W/OUT INJ PAST YR: ICD-10-PCS | Mod: CPTII,S$GLB,, | Performed by: INTERNAL MEDICINE

## 2020-08-17 PROCEDURE — 3008F BODY MASS INDEX DOCD: CPT | Mod: CPTII,S$GLB,, | Performed by: INTERNAL MEDICINE

## 2020-08-17 PROCEDURE — 99999 PR PBB SHADOW E&M-EST. PATIENT-LVL V: CPT | Mod: PBBFAC,,, | Performed by: INTERNAL MEDICINE

## 2020-08-17 PROCEDURE — 1159F MED LIST DOCD IN RCRD: CPT | Mod: S$GLB,,, | Performed by: INTERNAL MEDICINE

## 2020-08-17 RX ORDER — DICYCLOMINE HYDROCHLORIDE 20 MG/1
TABLET ORAL
COMMUNITY
Start: 2020-06-03 | End: 2021-03-12

## 2020-08-17 RX ORDER — HYDROCODONE BITARTRATE AND ACETAMINOPHEN 5; 325 MG/1; MG/1
TABLET ORAL
COMMUNITY
Start: 2020-07-06

## 2020-08-17 RX ORDER — OMEPRAZOLE 40 MG/1
40 CAPSULE, DELAYED RELEASE ORAL DAILY
COMMUNITY
Start: 2020-07-27

## 2020-08-17 RX ORDER — AZITHROMYCIN 250 MG/1
TABLET, FILM COATED ORAL
Qty: 6 TABLET | Refills: 0 | Status: SHIPPED | OUTPATIENT
Start: 2020-08-17 | End: 2021-03-12 | Stop reason: SDUPTHER

## 2020-08-17 NOTE — PROGRESS NOTES
Pulmonary Outpatient Follow Up Visit     Subjective:       Patient ID: Tanner Ulloa is a 69 y.o. male.    Chief Complaint: Pleural Effusion      HPI          69-year-old male patient presenting for 2 month follow-up.    Known with moderate COPD on long-acting anti muscarinic and albuterol p.r.n.    Complaining of worsening sputum production mainly clear and coughing over the last 2 months.    Recent history of fall and diagnosed with left subclavian steal syndrome undergoing angioplasty tomorrow.    PET scan was done showed circumferential pleural nodularity and thickening with low avidity.  Evaluated by Dr. Garcia CT surgery, surgical approach was deferred and the plan was to follow up with CT scan however I referred him to CT-guided biopsy by IR and came back negative for malignancy in June 2020    He was initially evaluated for pleural thickening pleural nodularity on CT chest 2019 February stable in December 2019 however concerning for mesothelioma.     Patient was evaluated as outpatient by a different pulmonology team who does visit the state for evaluation of patient's with history of exposure and at risk for mesothelioma.     He has had 2 CT scans February and December 2019 showing right-sided pleural thickening and pleural nodularity.     PFT done also in 2019 that showed moderate COPD.     Patient complain of shortness of breath on exertion, rare coughing and wheezing.     He does report about 13 lb weight loss over the last year.     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     He was recently evaluated by his cardiologist Dr. Mills he has 2 stents last 1 was done December 2019.     To be noted this patient has had a right-sided pleural effusion in 2016 evaluated at our office no evidence of malignancy      Review of Systems   Constitutional: Positive for weight loss, fatigue and  weakness. Negative for fever and chills.   HENT: Positive for voice change and hearing loss. Negative for nosebleeds.    Eyes: Negative for redness.   Respiratory: Positive for cough, sputum production, shortness of breath, wheezing, dyspnea on extertion and use of rescue inhaler. Negative for choking.    Cardiovascular: Positive for chest pain.        CAD status post stents   Genitourinary: Negative for hematuria.   Endocrine: Negative for cold intolerance.    Musculoskeletal: Positive for arthralgias, back pain and gait problem.   Skin: Negative for rash.   Gastrointestinal: Negative for vomiting.        Hep C     diarrhea   Neurological: Negative for syncope.   Hematological: Negative for adenopathy. Bleeds easily and excessive bruising.   Psychiatric/Behavioral: Negative for confusion. The patient is nervous/anxious.        Outpatient Encounter Medications as of 8/17/2020   Medication Sig Dispense Refill    albuterol (PROVENTIL/VENTOLIN HFA) 90 mcg/actuation inhaler Inhale 2 puffs into the lungs every 6 (six) hours as needed for Wheezing. Rescue 6.7 g 11    aspirin (ECOTRIN) 81 MG EC tablet       BRILINTA 90 mg tablet       cholestyramine-aspartame (QUESTRAN LIGHT) 4 gram PwPk Take 4 g by mouth.      dicyclomine (BENTYL) 20 mg tablet       esomeprazole (NEXIUM) 20 MG capsule Take 20 mg by mouth every other day.      evolocumab (REPATHA SURECLICK) 140 mg/mL PnIj Inject 1 Syringe into the skin.      FLUoxetine 40 MG capsule Take 40 mg by mouth once daily.      HYDROcodone-acetaminophen (NORCO) 5-325 mg per tablet       metoprolol succinate (TOPROL-XL) 25 MG 24 hr tablet       nitroGLYCERIN (NITROSTAT) 0.4 MG SL tablet       omeprazole (PRILOSEC) 40 MG capsule       oxyCODONE (ROXICODONE) 20 mg Tab immediate release tablet       promethazine (PHENERGAN) 12.5 MG Tab Take 1 tablet (12.5 mg total) by mouth every 6 (six) hours as needed. 22 tablet 0    testosterone cypionate (DEPOTESTOTERONE CYPIONATE)  "200 mg/mL injection       umeclidinium (INCRUSE ELLIPTA) 62.5 mcg/actuation DsDv Inhale 1 each into the lungs once daily. Controller 1 each 5    zolpidem (AMBIEN) 10 mg Tab       azithromycin (Z-ROSEMARY) 250 MG tablet Take 2 tablets by mouth on day 1; Take 1 tablet by mouth on days 2-5 6 tablet 0    [DISCONTINUED] hyoscyamine (LEVSIN/SL) 0.125 mg Subl Place 1 tablet (0.125 mg total) under the tongue every 4 (four) hours as needed. 90 tablet 1     No facility-administered encounter medications on file as of 8/17/2020.        Objective:     Vital Signs (Most Recent)  Vital Signs  Temp: 97.7 °F (36.5 °C)  Temp src: Temporal  Pulse: 73  Resp: 16  SpO2: 95 %  BP: 102/66  Height and Weight  Height: 5' 7" (170.2 cm)  Weight: 73.9 kg (163 lb 0.5 oz)  BSA (Calculated - sq m): 1.87 sq meters  BMI (Calculated): 25.5  Weight in (lb) to have BMI = 25: 159.3]  Wt Readings from Last 2 Encounters:   08/17/20 73.9 kg (163 lb 0.5 oz)   06/12/20 72.1 kg (159 lb)       Physical Exam   Constitutional: He is oriented to person, place, and time. He appears well-developed and well-nourished.   HENT:   Head: Normocephalic.   Neck: Neck supple.   Cardiovascular: Normal rate, regular rhythm and normal heart sounds.   Pulmonary/Chest: Normal expansion and effort normal. No stridor. No respiratory distress. He has decreased breath sounds. He exhibits no tenderness.   Abdominal: Soft. He exhibits no distension.   Musculoskeletal:         General: No tenderness or deformity.   Lymphadenopathy:     He has no cervical adenopathy.   Neurological: He is alert and oriented to person, place, and time. Gait normal.   Skin: Skin is warm. No cyanosis. Nails show no clubbing.   Upper extremity bruises   Psychiatric: He has a normal mood and affect. His behavior is normal. Judgment and thought content normal.   Nursing note and vitals reviewed.      Laboratory  Lab Results   Component Value Date    WBC 10.66 06/12/2020    RBC 5.16 06/12/2020    HGB 15.9 " 06/12/2020    HCT 50.8 06/12/2020    MCV 98 06/12/2020    MCH 30.8 06/12/2020    MCHC 31.3 (L) 06/12/2020    RDW 15.0 (H) 06/12/2020     06/12/2020    MPV 9.0 (L) 06/12/2020    GRAN 7.3 06/12/2020    GRAN 68.1 06/12/2020    LYMPH 2.0 06/12/2020    LYMPH 18.4 06/12/2020    MONO 0.9 06/12/2020    MONO 8.2 06/12/2020    EOS 0.4 06/12/2020    BASO 0.07 06/12/2020    EOSINOPHIL 4.1 06/12/2020    BASOPHIL 0.7 06/12/2020       BMP  Lab Results   Component Value Date     06/03/2020    K 4.6 06/03/2020     06/03/2020    CO2 26 06/03/2020    BUN 18 06/03/2020    CREATININE 1.4 06/03/2020    CALCIUM 9.7 06/03/2020    ANIONGAP 11 06/03/2020    ESTGFRAFRICA 59.3 (A) 06/03/2020    EGFRNONAA 51.3 (A) 06/03/2020    AST 27 04/07/2020    ALT 33 04/07/2020    PROT 7.7 04/07/2020       No results found for: BNP    No results found for: TSH    No results found for: SEDRATE    No results found for: CRP  No results found for: IGE     No results found for: ASPERGILLUS  No results found for: AFUMIGATUSCL     No results found for: ACE   Pathology June 2020  BIOPSIES FROM RIGHT LUNG:  NONSPECIFIC VERY HYPOCELLULAR DENSE FIBROSIS  NO NEOPLASIA IDENTIFIED  NO AREAS SUGGESTS THE PRESENCE OF MESOTHELIOMA      Pleural effusion cytology February 2016-for malignancy.      Diagnostic Results:  I have personally reviewed today the following studies:    PET  scan 3/2020:      Nonspecific subpleural ground-glass opacities noted within the bilateral lung bases demonstrating low level uptake with a SUV max of up to 1.8.  Mild near circumferential pleural thickening with sparing along the mediastinal surface of the right lung base along with a possible trace/small right-sided effusion also demonstrating low level uptake with a SUV max of 2.4.  This is nonspecific.  Although the SUV value is low the possibility of mesothelioma is not excluded.  Any SUV values above 2.0-2.2 within the pleura are suspicious.            CT scan February 2019  and December 2019 pleural thickening, right lung volume loss, pleural nodularity concerning for mesothelioma.    Spirometry 2019 moderate COPD.      PFT March 2020    Grade A-D -acceptable quality of tracings   Normal spirometry. (FEV1/VC greater than or equal to LLN and FVC greater than or equal to LLN)   Moderate restriction based on reduced Forced Vital Capacity (FVC). Consider lung volumes if clinically indicated.   Mild restriction. (TLC< LLN and > or equal to 70% of predicted).   Mild reduction in diffusion capacity -unadjusted for hemoglobin (DLCO > or equal to 60% predicted and < LLN) .   Maximum voluntary ventilation is normal. - (MVV % predicted is greater than or equal to LLN )   Flow volume loops demonstrate a restrictive defect.   Overall there is no significant ventilatory impairment. (FEV1 >LLN)   Pattern of restriction and decreased diffusion suggest interstitial pulmonary fibrosis  Assessment/Plan:   Pleural thickening  -     CT Chest With Contrast; Future; Expected date: 02/17/2021    Pleural nodules  -     CT Chest With Contrast; Future; Expected date: 02/17/2021  -     CREATININE, SERUM; Future; Expected date: 02/17/2021    COPD, moderate  -     azithromycin (Z-ROSEMARY) 250 MG tablet; Take 2 tablets by mouth on day 1; Take 1 tablet by mouth on days 2-5  Dispense: 6 tablet; Refill: 0  -     Spirometry without Bronchodilator; Future; Expected date: 02/17/2021    COPD with acute exacerbation  -     Culture, Respiratory with Gram Stain; Future; Expected date: 08/17/2020  -     AFB Culture & Smear; Future; Expected date: 08/17/2020  -     azithromycin (Z-ROSEMARY) 250 MG tablet; Take 2 tablets by mouth on day 1; Take 1 tablet by mouth on days 2-5  Dispense: 6 tablet; Refill: 0    Hoarseness  -     Ambulatory referral/consult to ENT; Future; Expected date: 08/24/2020      Monitor pleural thickening with CT chest with IV contrast in 6 months.    Continue albuterol p.r.n.    Continue long-acting anti  muscarinic.    Also antibiotic with Z-Narciso.    Check sputum culture/acid-fast stain.    Referred to ENT for hoarseness.    More than 100 pack year smoker.    Encouraged to continue smoking cessation.    Undergoing angioplasty for subclavian steal syndrome tomorrow.        Follow up in about 6 months (around 2/17/2021).    This note was prepared using voice recognition system and is likely to have sound alike errors that may have been overlooked even after proof reading.  Please call me with any questions    Discussed diagnosis, its evaluation, treatment and usual course. All questions answered.      Zoe Garland MD

## 2020-08-18 ENCOUNTER — LAB VISIT (OUTPATIENT)
Dept: LAB | Facility: HOSPITAL | Age: 70
End: 2020-08-18
Attending: INTERNAL MEDICINE
Payer: MEDICARE

## 2020-08-18 DIAGNOSIS — J44.1 COPD WITH ACUTE EXACERBATION: ICD-10-CM

## 2020-08-18 PROCEDURE — 87070 CULTURE OTHR SPECIMN AEROBIC: CPT

## 2020-08-18 PROCEDURE — 87205 SMEAR GRAM STAIN: CPT

## 2020-08-21 LAB
BACTERIA SPEC AEROBE CULT: NORMAL
BACTERIA SPEC AEROBE CULT: NORMAL
GRAM STN SPEC: NORMAL

## 2020-08-28 DIAGNOSIS — Z01.818 PRE-PROCEDURAL EXAMINATION: Primary | ICD-10-CM

## 2020-09-11 ENCOUNTER — LAB VISIT (OUTPATIENT)
Dept: OTOLARYNGOLOGY | Facility: CLINIC | Age: 70
End: 2020-09-11
Payer: MEDICARE

## 2020-09-11 DIAGNOSIS — Z01.818 PRE-PROCEDURAL EXAMINATION: ICD-10-CM

## 2020-09-11 PROCEDURE — U0003 INFECTIOUS AGENT DETECTION BY NUCLEIC ACID (DNA OR RNA); SEVERE ACUTE RESPIRATORY SYNDROME CORONAVIRUS 2 (SARS-COV-2) (CORONAVIRUS DISEASE [COVID-19]), AMPLIFIED PROBE TECHNIQUE, MAKING USE OF HIGH THROUGHPUT TECHNOLOGIES AS DESCRIBED BY CMS-2020-01-R: HCPCS

## 2020-09-12 LAB — SARS-COV-2 RNA RESP QL NAA+PROBE: NOT DETECTED

## 2020-09-14 ENCOUNTER — OFFICE VISIT (OUTPATIENT)
Dept: OTOLARYNGOLOGY | Facility: CLINIC | Age: 70
End: 2020-09-14
Payer: MEDICARE

## 2020-09-14 VITALS
BODY MASS INDEX: 25.72 KG/M2 | TEMPERATURE: 66 F | DIASTOLIC BLOOD PRESSURE: 73 MMHG | SYSTOLIC BLOOD PRESSURE: 130 MMHG | HEART RATE: 75 BPM | WEIGHT: 164.25 LBS

## 2020-09-14 DIAGNOSIS — Z87.891 HISTORY OF SMOKING: ICD-10-CM

## 2020-09-14 DIAGNOSIS — R49.0 HOARSENESS: Primary | ICD-10-CM

## 2020-09-14 DIAGNOSIS — J38.3 LESION OF VOCAL CORD: ICD-10-CM

## 2020-09-14 DIAGNOSIS — Z03.818 ENCOUNTER FOR OBSERVATION FOR SUSPECTED EXPOSURE TO OTHER BIOLOGICAL AGENTS RULED OUT: ICD-10-CM

## 2020-09-14 PROCEDURE — 99999 PR PBB SHADOW E&M-EST. PATIENT-LVL V: CPT | Mod: PBBFAC,,, | Performed by: ORTHOPAEDIC SURGERY

## 2020-09-14 PROCEDURE — 3008F BODY MASS INDEX DOCD: CPT | Mod: CPTII,S$GLB,, | Performed by: ORTHOPAEDIC SURGERY

## 2020-09-14 PROCEDURE — 1159F MED LIST DOCD IN RCRD: CPT | Mod: S$GLB,,, | Performed by: ORTHOPAEDIC SURGERY

## 2020-09-14 PROCEDURE — 1159F PR MEDICATION LIST DOCUMENTED IN MEDICAL RECORD: ICD-10-PCS | Mod: S$GLB,,, | Performed by: ORTHOPAEDIC SURGERY

## 2020-09-14 PROCEDURE — 1125F PR PAIN SEVERITY QUANTIFIED, PAIN PRESENT: ICD-10-PCS | Mod: S$GLB,,, | Performed by: ORTHOPAEDIC SURGERY

## 2020-09-14 PROCEDURE — 99214 PR OFFICE/OUTPT VISIT, EST, LEVL IV, 30-39 MIN: ICD-10-PCS | Mod: 25,S$GLB,, | Performed by: ORTHOPAEDIC SURGERY

## 2020-09-14 PROCEDURE — 1125F AMNT PAIN NOTED PAIN PRSNT: CPT | Mod: S$GLB,,, | Performed by: ORTHOPAEDIC SURGERY

## 2020-09-14 PROCEDURE — 1101F PR PT FALLS ASSESS DOC 0-1 FALLS W/OUT INJ PAST YR: ICD-10-PCS | Mod: CPTII,S$GLB,, | Performed by: ORTHOPAEDIC SURGERY

## 2020-09-14 PROCEDURE — 31575 PR LARYNGOSCOPY, FLEXIBLE; DIAGNOSTIC: ICD-10-PCS | Mod: S$GLB,,, | Performed by: ORTHOPAEDIC SURGERY

## 2020-09-14 PROCEDURE — 1101F PT FALLS ASSESS-DOCD LE1/YR: CPT | Mod: CPTII,S$GLB,, | Performed by: ORTHOPAEDIC SURGERY

## 2020-09-14 PROCEDURE — 3008F PR BODY MASS INDEX (BMI) DOCUMENTED: ICD-10-PCS | Mod: CPTII,S$GLB,, | Performed by: ORTHOPAEDIC SURGERY

## 2020-09-14 PROCEDURE — 99999 PR PBB SHADOW E&M-EST. PATIENT-LVL V: ICD-10-PCS | Mod: PBBFAC,,, | Performed by: ORTHOPAEDIC SURGERY

## 2020-09-14 PROCEDURE — 99214 OFFICE O/P EST MOD 30 MIN: CPT | Mod: 25,S$GLB,, | Performed by: ORTHOPAEDIC SURGERY

## 2020-09-14 PROCEDURE — 31575 DIAGNOSTIC LARYNGOSCOPY: CPT | Mod: S$GLB,,, | Performed by: ORTHOPAEDIC SURGERY

## 2020-09-14 RX ORDER — TRAZODONE HYDROCHLORIDE 50 MG/1
50 TABLET ORAL NIGHTLY
COMMUNITY

## 2020-09-14 NOTE — Clinical Note
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

## 2020-09-14 NOTE — LETTER
September 14, 2020      Zoe Garland MD  [address] [place] [address] Dr Jessenia [place]ON [id number]           [facility]  [address] [place] [address] M[address] [place] [address]  Phone: [phone]  Fax: [phone]          Patient: Tanner Ulloa   MR Number: [id number]   YOB: 1950   Date of Visit: 9/14/2020       Dear Dr. Zoe Garland:    Thank you for referring Tanner Ulloa to me for evaluation. Attached you will find relevant portions of my assessment and plan of care.    If you have questions, please do not hesitate to call me. I look forward to following Tanner Ulloa along with you.    Sincerely,    Francisca Millard MD    Enclosure  CC:  No Recipients    If you would like to receive this communication electronically, please contact [email or web address] or [phone] to request more information on [employer] Link access.    For providers and/or their staff who would like to refer a patient to [facility], please contact us through our one-stop-shop provider referral line, [facility], at [phone].    If you feel you have received this communication in error or would no longer like to receive these types of communications, please e-mail [email or web address]

## 2020-09-14 NOTE — PROGRESS NOTES
Subjective:      Patient ID: Tanner Ulloa is a 70 y.o. male.    Chief Complaint: Hoarse (Scope exam)    Patient is a very pleasant 70 year old male here to see me today in consultation at the request of Dr. Garland for evaluation of hoarseness.  He reports that symptoms first started about six months ago (possibly longer), and are not intermittent in nature.  He describes their voice as hoarse, raspy.  The patient reports roughness in the voice, inability to project the voice and inability to reach high pitches.  He also reports dysphagia, issues with solids at times (predominately with meat), no odynophagia.  He has a history of 80 pack years (2 packs daily for 40 years), stopped in 2014.  He has issues with reflux, is on medication.  He does not have unusual vocal demands, such as needing to project the voice or singing frequently.  He has had a negative COVID test in preparation for today's visit.  He also notes a coughing and choking sensation at night, and he wakes with excess thick mucus in his throat.          Review of Systems   Constitutional: Negative for fatigue, fever and unexpected weight change.   HENT: Positive for trouble swallowing and voice change. Negative for congestion, ear discharge, ear pain, facial swelling, hearing loss, nosebleeds, postnasal drip, rhinorrhea, sinus pressure, sneezing, sore throat and tinnitus.    Eyes: Negative for discharge, redness and itching.   Respiratory: Negative for cough, choking, shortness of breath and wheezing.    Cardiovascular: Negative for chest pain and palpitations.   Gastrointestinal: Negative for abdominal pain.        Has GERD, on medication   Musculoskeletal: Negative for neck pain.   Neurological: Negative for dizziness, facial asymmetry, light-headedness and headaches.   Hematological: Negative for adenopathy. Does not bruise/bleed easily.   Psychiatric/Behavioral: Negative for agitation, behavioral problems, confusion and decreased  concentration.       Objective:       Physical Exam  Constitutional:       General: He is not in acute distress.     Appearance: He is well-developed.   HENT:      Head: Normocephalic and atraumatic.      Jaw: No trismus.      Right Ear: Hearing, tympanic membrane, ear canal and external ear normal.      Left Ear: Hearing, tympanic membrane, ear canal and external ear normal.      Nose: Nose normal. No nasal deformity, septal deviation, mucosal edema or rhinorrhea.      Mouth/Throat:      Dentition: Has dentures (upper and lower).      Pharynx: Uvula midline. No oropharyngeal exudate or uvula swelling.      Comments: Very rough voice; base of tongue and floor of mouth soft  Eyes:      General: No scleral icterus.     Conjunctiva/sclera: Conjunctivae normal.      Right eye: Right conjunctiva is not injected. No chemosis.     Left eye: Left conjunctiva is not injected. No chemosis.     Pupils: Pupils are equal, round, and reactive to light.   Neck:      Thyroid: No thyroid mass or thyromegaly.      Trachea: Trachea and phonation normal. No tracheal tenderness or tracheal deviation.   Pulmonary:      Effort: Pulmonary effort is normal. No accessory muscle usage or respiratory distress.      Breath sounds: No stridor.   Lymphadenopathy:      Head:      Right side of head: No submental, submandibular, preauricular or posterior auricular adenopathy.      Left side of head: No submental, submandibular, preauricular or posterior auricular adenopathy.      Cervical: No cervical adenopathy.      Right cervical: No superficial or deep cervical adenopathy.     Left cervical: No superficial or deep cervical adenopathy.   Skin:     General: Skin is warm and dry.      Findings: No erythema or rash.   Neurological:      Mental Status: He is alert and oriented to person, place, and time.      Cranial Nerves: No cranial nerve deficit.   Psychiatric:         Behavior: Behavior normal.         Thought Content: Thought content normal.        Due to indication in patient's history, presentation or risk factors,  a fiber optic exam was performed.    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  The nasopharynx (adenoid pad) and eustachian tube orifices were first visualized and were found to be normal, without masses or irregularity.  The posterior pharyngeal wall and base of tongue were then examined and no mass or irregular tissue was seen.  The scope was then advanced to the larynx, and the epiglottis, valleculae, and piriform sinuses were normal, without masses or mucosal irregularity.  The false vocal folds and true vocal folds were then examined and were found to have normal mobility (full abduction and adduction).  His left anterior TVF had an area of leukoplakia vs scar tissue extending to the anterior commissure.  The arytenoids and the interarytenoid area were normal.        Assessment:       1. Hoarseness    2. Lesion of vocal cord    3. History of smoking        Plan:     Hoarseness  -     Ambulatory referral/consult to ENT  -     Ambulatory referral/consult to ENT; Future; Expected date: 09/21/2020    Lesion of vocal cord  -     Ambulatory referral/consult to ENT; Future; Expected date: 09/21/2020    History of smoking  -     Ambulatory referral/consult to ENT; Future; Expected date: 09/21/2020    He has an area of leukoplakia vs scar on the left vocal cord extending to the anterior commissure.  With his new onset hoarseness and extensive smoking history, he is at high risk for a vocal cord cancer.  I would recommend evaluation with a dedicated laryngologist in , as stroboscopy may make visualization of this area more clear.  Pending those results, may require DMSL.  Will send referral to Dr. Butler in  [place].

## 2020-10-30 ENCOUNTER — TELEPHONE (OUTPATIENT)
Dept: OTOLARYNGOLOGY | Facility: CLINIC | Age: 70
End: 2020-10-30

## 2021-02-17 ENCOUNTER — TELEPHONE (OUTPATIENT)
Dept: PULMONOLOGY | Facility: CLINIC | Age: 71
End: 2021-02-17

## 2021-02-19 ENCOUNTER — LAB VISIT (OUTPATIENT)
Dept: URGENT CARE | Facility: CLINIC | Age: 71
End: 2021-02-19
Payer: MEDICARE

## 2021-02-19 DIAGNOSIS — Z03.818 ENCOUNTER FOR OBSERVATION FOR SUSPECTED EXPOSURE TO OTHER BIOLOGICAL AGENTS RULED OUT: ICD-10-CM

## 2021-02-19 PROCEDURE — U0003 INFECTIOUS AGENT DETECTION BY NUCLEIC ACID (DNA OR RNA); SEVERE ACUTE RESPIRATORY SYNDROME CORONAVIRUS 2 (SARS-COV-2) (CORONAVIRUS DISEASE [COVID-19]), AMPLIFIED PROBE TECHNIQUE, MAKING USE OF HIGH THROUGHPUT TECHNOLOGIES AS DESCRIBED BY CMS-2020-01-R: HCPCS

## 2021-02-19 PROCEDURE — U0005 INFEC AGEN DETEC AMPLI PROBE: HCPCS

## 2021-02-20 LAB — SARS-COV-2 RNA RESP QL NAA+PROBE: NOT DETECTED

## 2021-02-21 ENCOUNTER — PATIENT MESSAGE (OUTPATIENT)
Dept: PULMONOLOGY | Facility: CLINIC | Age: 71
End: 2021-02-21

## 2021-02-23 ENCOUNTER — HOSPITAL ENCOUNTER (OUTPATIENT)
Dept: RADIOLOGY | Facility: HOSPITAL | Age: 71
Discharge: HOME | End: 2021-02-23
Attending: INTERNAL MEDICINE
Payer: MEDICARE

## 2021-02-23 ENCOUNTER — CLINICAL SUPPORT (OUTPATIENT)
Dept: PULMONOLOGY | Facility: CLINIC | Age: 71
End: 2021-02-23
Payer: MEDICARE

## 2021-02-23 DIAGNOSIS — J92.9 PLEURAL THICKENING: ICD-10-CM

## 2021-02-23 DIAGNOSIS — R22.2 PLEURAL NODULES: ICD-10-CM

## 2021-02-23 DIAGNOSIS — J44.9 COPD, MODERATE: ICD-10-CM

## 2021-02-23 PROCEDURE — 71260 CT THORAX DX C+: CPT | Mod: TC

## 2021-02-23 PROCEDURE — 94010 BREATHING CAPACITY TEST: ICD-10-PCS | Mod: S$GLB,,, | Performed by: INTERNAL MEDICINE

## 2021-02-23 PROCEDURE — 94010 BREATHING CAPACITY TEST: CPT | Mod: S$GLB,,, | Performed by: INTERNAL MEDICINE

## 2021-02-23 PROCEDURE — 25500020 PHARM REV CODE 255: Performed by: INTERNAL MEDICINE

## 2021-02-23 RX ADMIN — IOHEXOL 100 ML: 350 INJECTION, SOLUTION INTRAVENOUS at 01:02

## 2021-02-24 LAB
BRPFT: NORMAL
FEF 25 75 LLN: 1.03
FEF 25 75 PRE REF: 63.7 %
FEF 25 75 REF: 2.36
FEV1 FVC LLN: 63
FEV1 FVC PRE REF: 94.4 %
FEV1 FVC REF: 76
FEV1 LLN: 2.2
FEV1 PRE REF: 70.8 %
FEV1 REF: 3.04
FVC LLN: 2.97
FVC PRE REF: 74.7 %
FVC REF: 4
PEF LLN: 5.84
PEF PRE REF: 49.6 %
PEF REF: 8.04
PRE FEF 25 75: 1.5 L/S
PRE FET 100: 9.7 SEC
PRE FEV1 FVC: 72.11 %
PRE FEV1: 2.15 L
PRE FVC: 2.99 L
PRE PEF: 3.98 L/S

## 2021-03-12 ENCOUNTER — OFFICE VISIT (OUTPATIENT)
Dept: PULMONOLOGY | Facility: CLINIC | Age: 71
End: 2021-03-12
Payer: MEDICARE

## 2021-03-12 ENCOUNTER — HOSPITAL ENCOUNTER (OUTPATIENT)
Dept: RADIOLOGY | Facility: HOSPITAL | Age: 71
Discharge: HOME | End: 2021-03-12
Attending: INTERNAL MEDICINE
Payer: MEDICARE

## 2021-03-12 VITALS
BODY MASS INDEX: 27.41 KG/M2 | WEIGHT: 174.63 LBS | OXYGEN SATURATION: 96 % | TEMPERATURE: 98 F | DIASTOLIC BLOOD PRESSURE: 86 MMHG | RESPIRATION RATE: 18 BRPM | HEIGHT: 67 IN | SYSTOLIC BLOOD PRESSURE: 140 MMHG | HEART RATE: 96 BPM

## 2021-03-12 DIAGNOSIS — R22.2 PLEURAL NODULES: ICD-10-CM

## 2021-03-12 DIAGNOSIS — J44.1 COPD WITH ACUTE EXACERBATION: ICD-10-CM

## 2021-03-12 DIAGNOSIS — J44.9 COPD, MODERATE: Primary | ICD-10-CM

## 2021-03-12 DIAGNOSIS — J92.9 PLEURAL THICKENING: ICD-10-CM

## 2021-03-12 DIAGNOSIS — Z95.5 HX OF HEART ARTERY STENT: ICD-10-CM

## 2021-03-12 DIAGNOSIS — Z87.891 FORMER SMOKER: ICD-10-CM

## 2021-03-12 DIAGNOSIS — Z91.81 HISTORY OF FALL: ICD-10-CM

## 2021-03-12 DIAGNOSIS — J44.9 COPD, MODERATE: ICD-10-CM

## 2021-03-12 DIAGNOSIS — Z87.891 STOPPED SMOKING WITH GREATER THAN 30 PACK YEAR HISTORY: ICD-10-CM

## 2021-03-12 PROCEDURE — 3288F FALL RISK ASSESSMENT DOCD: CPT | Mod: CPTII,S$GLB,, | Performed by: INTERNAL MEDICINE

## 2021-03-12 PROCEDURE — 3288F PR FALLS RISK ASSESSMENT DOCUMENTED: ICD-10-PCS | Mod: CPTII,S$GLB,, | Performed by: INTERNAL MEDICINE

## 2021-03-12 PROCEDURE — 71046 XR CHEST PA AND LATERAL: ICD-10-PCS | Mod: 26,,, | Performed by: RADIOLOGY

## 2021-03-12 PROCEDURE — 71046 X-RAY EXAM CHEST 2 VIEWS: CPT | Mod: TC

## 2021-03-12 PROCEDURE — 1159F MED LIST DOCD IN RCRD: CPT | Mod: S$GLB,,, | Performed by: INTERNAL MEDICINE

## 2021-03-12 PROCEDURE — 3008F BODY MASS INDEX DOCD: CPT | Mod: CPTII,S$GLB,, | Performed by: INTERNAL MEDICINE

## 2021-03-12 PROCEDURE — 1101F PR PT FALLS ASSESS DOC 0-1 FALLS W/OUT INJ PAST YR: ICD-10-PCS | Mod: CPTII,S$GLB,, | Performed by: INTERNAL MEDICINE

## 2021-03-12 PROCEDURE — 99214 OFFICE O/P EST MOD 30 MIN: CPT | Mod: S$GLB,,, | Performed by: INTERNAL MEDICINE

## 2021-03-12 PROCEDURE — 99999 PR PBB SHADOW E&M-EST. PATIENT-LVL IV: ICD-10-PCS | Mod: PBBFAC,,, | Performed by: INTERNAL MEDICINE

## 2021-03-12 PROCEDURE — 1159F PR MEDICATION LIST DOCUMENTED IN MEDICAL RECORD: ICD-10-PCS | Mod: S$GLB,,, | Performed by: INTERNAL MEDICINE

## 2021-03-12 PROCEDURE — 3008F PR BODY MASS INDEX (BMI) DOCUMENTED: ICD-10-PCS | Mod: CPTII,S$GLB,, | Performed by: INTERNAL MEDICINE

## 2021-03-12 PROCEDURE — 71046 X-RAY EXAM CHEST 2 VIEWS: CPT | Mod: 26,,, | Performed by: RADIOLOGY

## 2021-03-12 PROCEDURE — 99214 PR OFFICE/OUTPT VISIT, EST, LEVL IV, 30-39 MIN: ICD-10-PCS | Mod: S$GLB,,, | Performed by: INTERNAL MEDICINE

## 2021-03-12 PROCEDURE — 1101F PT FALLS ASSESS-DOCD LE1/YR: CPT | Mod: CPTII,S$GLB,, | Performed by: INTERNAL MEDICINE

## 2021-03-12 PROCEDURE — 99999 PR PBB SHADOW E&M-EST. PATIENT-LVL IV: CPT | Mod: PBBFAC,,, | Performed by: INTERNAL MEDICINE

## 2021-03-12 RX ORDER — UMECLIDINIUM BROMIDE AND VILANTEROL TRIFENATATE 62.5; 25 UG/1; UG/1
1 POWDER RESPIRATORY (INHALATION) DAILY
Qty: 1 EACH | Refills: 5 | Status: SHIPPED | OUTPATIENT
Start: 2021-03-12

## 2021-03-12 RX ORDER — AZITHROMYCIN 250 MG/1
TABLET, FILM COATED ORAL
Qty: 6 TABLET | Refills: 0 | Status: SHIPPED | OUTPATIENT
Start: 2021-03-12

## 2021-05-06 ENCOUNTER — PATIENT MESSAGE (OUTPATIENT)
Dept: RESEARCH | Facility: HOSPITAL | Age: 71
End: 2021-05-06

## 2021-09-20 ENCOUNTER — PATIENT MESSAGE (OUTPATIENT)
Dept: PULMONOLOGY | Facility: CLINIC | Age: 71
End: 2021-09-20

## 2022-12-06 ENCOUNTER — PATIENT MESSAGE (OUTPATIENT)
Dept: RESEARCH | Facility: HOSPITAL | Age: 72
End: 2022-12-06
Payer: MEDICARE

## 2023-05-09 ENCOUNTER — PATIENT MESSAGE (OUTPATIENT)
Dept: PULMONOLOGY | Facility: CLINIC | Age: 73
End: 2023-05-09
Payer: MEDICARE

## 2023-07-07 ENCOUNTER — PATIENT MESSAGE (OUTPATIENT)
Dept: INFECTIOUS DISEASES | Facility: CLINIC | Age: 73
End: 2023-07-07
Payer: MEDICARE